# Patient Record
Sex: FEMALE | Race: WHITE | NOT HISPANIC OR LATINO | URBAN - METROPOLITAN AREA
[De-identification: names, ages, dates, MRNs, and addresses within clinical notes are randomized per-mention and may not be internally consistent; named-entity substitution may affect disease eponyms.]

---

## 2021-11-08 ENCOUNTER — EMERGENCY (EMERGENCY)
Facility: HOSPITAL | Age: 21
LOS: 0 days | Discharge: HOME | End: 2021-11-08
Attending: STUDENT IN AN ORGANIZED HEALTH CARE EDUCATION/TRAINING PROGRAM | Admitting: STUDENT IN AN ORGANIZED HEALTH CARE EDUCATION/TRAINING PROGRAM
Payer: COMMERCIAL

## 2021-11-08 VITALS
OXYGEN SATURATION: 100 % | DIASTOLIC BLOOD PRESSURE: 66 MMHG | HEART RATE: 88 BPM | SYSTOLIC BLOOD PRESSURE: 116 MMHG | RESPIRATION RATE: 20 BRPM

## 2021-11-08 VITALS
SYSTOLIC BLOOD PRESSURE: 120 MMHG | DIASTOLIC BLOOD PRESSURE: 72 MMHG | TEMPERATURE: 97 F | OXYGEN SATURATION: 100 % | RESPIRATION RATE: 20 BRPM | HEART RATE: 100 BPM

## 2021-11-08 DIAGNOSIS — R51.9 HEADACHE, UNSPECIFIED: ICD-10-CM
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
## 2021-11-08 DIAGNOSIS — Z91.011 ALLERGY TO MILK PRODUCTS: ICD-10-CM

## 2021-11-08 PROCEDURE — 99285 EMERGENCY DEPT VISIT HI MDM: CPT

## 2021-11-08 PROCEDURE — 72125 CT NECK SPINE W/O DYE: CPT | Mod: 26,MA

## 2021-11-08 PROCEDURE — 70450 CT HEAD/BRAIN W/O DYE: CPT | Mod: 26,MA

## 2021-11-08 NOTE — ED PROVIDER NOTE - NSFOLLOWUPCLINICS_GEN_ALL_ED_FT
[facility]  Medicine  [address], [place]   Phone: [phone]  Fax:   Follow Up Time: 1-3 Days

## 2021-11-08 NOTE — ED PROVIDER NOTE - NS ED ROS FT
Constitutional: Negative for fever  HENT: + headache.  Eyes: Negative for vision change.  Cardiovascular: Negative for chest pain, palpitation, and orthopnea.  Respiratory: Negative for SOB, wheezing, cough and sputum production.  Gastrointestinal: Negative for nausea, vomiting, abdominal pain, constipation, diarrhea, hematochezia, and melena.  Neurological: Negative for dizziness, syncope, focal weakness, numbness, and loss of consciousness.  Hematological: Does not bruise/bleed easily.

## 2021-11-08 NOTE — ED PROVIDER NOTE - PHYSICAL EXAMINATION
CONSTITUTIONAL: Well-appearing; well-nourished; in no apparent distress.   HEAD: Normocephalic; atraumatic.   EYES: Pupils are round and reactive, extra-ocular muscles are intact.   ENT: External ears are non-tender and without swelling or erythema. Hearing is intact with good acuity to spoken voice.  Patient is speaking clearly, not muffled and airway is intact.   NECK: Neck is supple without adenopathy. Trachea is midline. No midline tenderness.   RESPIRATORY: Chest wall is symmetric without deformity. No signs of respiratory distress.   CARDIOVASCULAR: Regular rate and rhythm. Normal peripheral perfusion.   GI: Abdomen is soft, non-tender,   BACK: No evidence of trauma or deformity. No midline tenderness.   PELVIS: No evidence of trauma or deformity. No tenderness to palpation.  NEURO: A & O x 4.   PSYCHOLOGICAL: Appropriate mood and affect. Good judgement and insight.

## 2021-11-08 NOTE — ED PROVIDER NOTE - PATIENT PORTAL LINK FT
You can access the FollowMyHealth Patient Portal offered by [facility] by registering at the following website: [email or web address]. By joining [facility]’s FollowMyHealth portal, you will also be able to view your health information using other applications (apps) compatible with our system.

## 2021-11-08 NOTE — ED PROVIDER NOTE - NSFOLLOWUPINSTRUCTIONS_ED_ALL_ED_FT
Fall Prevention    WHAT YOU NEED TO KNOW:    What is fall prevention? Fall prevention includes ways to make your home and other areas safer. It also includes ways you can move more carefully to prevent a fall.    What increases my risk for falls?   •Lack of vitamin D      •Not getting enough sleep each night      •Trouble walking or keeping your balance, or foot problems      •Health conditions that cause changes in your blood pressure, vision, or muscle strength and coordination      •Medicines that make you dizzy, weak, or sleepy      •Problems seeing clearly      •Shoes that have high heels or are not supportive      •Tripping hazards, such as items left on the floor, no handrails on the stairs, or broken steps      How can I help protect myself from falls?   •Stand or sit up slowly. This may help you keep your balance and prevent falls. If you need to get up during the night, sit up first. Be sure you are fully awake before you stand. Turn on the light before you start walking. Go slowly in case you are still sleepy. Make sure you will not trip over any pets sleeping in the bedroom.      •Use assistive devices as directed. Your healthcare provider may suggest that you use a cane or walker to help you keep your balance. You may need to have grab bars put in your bathroom near the toilet or in the shower.      •Wear shoes that fit well and have soles that . Wear shoes both inside and outside. Use slippers with good . Do not wear shoes with high heels.      •Wear a personal alarm. This is a device that allows you to call 911 if you fall and need help. Ask your healthcare provider for more information.      •Stay active. Exercise can help strengthen your muscles and improve your balance. Your healthcare provider may recommend water aerobics or walking. He or she may also recommend physical therapy to improve your coordination. Never start an exercise program without talking to your healthcare provider first.  Walking for Exercise           •Manage medical conditions. Keep all appointments with your healthcare providers. Visit your eye doctor as directed.      How can I make my home safer?     Fall Prevention for Adults     •Add items to prevent falls in the bathroom. Put nonslip strips on your bath or shower floor to prevent you from slipping. Use a bath mat if you do not have carpet in the bathroom. This will prevent you from falling when you step out of the bath or shower. Use a shower seat so you do not need to stand while you shower. Sit on the toilet or a chair in your bathroom to dry yourself and put on clothing. This will prevent you from losing your balance from drying or dressing yourself while you are standing.      •Keep paths clear. Remove books, shoes, and other objects from walkways and stairs. Place cords for telephones and lamps out of the way so that you do not need to walk over them. Tape them down if you cannot move them. Remove small rugs. If you cannot remove a rug, secure it with double-sided tape. This will prevent you from tripping.      •Install bright lights in your home. Use night lights to help light paths to the bathroom or kitchen. Always turn on the light before you start walking.      •Keep items you use often on shelves within reach. Do not use a step stool to help you reach an item.      •Paint or place reflective tape on the edges of your stairs. This will help you see the stairs better.      Call 911 or have someone else call if:   •You have fallen and are unconscious.      •You have fallen and cannot move part of your body.      Contact your healthcare provider if:   •You have fallen and have pain or a headache.      •You have questions or concerns about your condition or care.      CARE AGREEMENT:    You have the right to help plan your care. Learn about your health condition and how it may be treated. Discuss treatment options with your healthcare providers to decide what care you want to receive. You always have the right to refuse treatment.
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
## 2021-11-08 NOTE — ED ADULT NURSE NOTE - NSIMPLEMENTINTERV_GEN_ALL_ED
Implemented All Universal Safety Interventions:  [place] to call system. Call bell, personal items and telephone within reach. Instruct patient to call for assistance. Room bathroom lighting operational. Non-slip footwear when patient is off stretcher. Physically safe environment: no spills, clutter or unnecessary equipment. Stretcher in lowest position, wheels locked, appropriate side rails in place.

## 2021-11-08 NOTE — ED PROVIDER NOTE - CLINICAL SUMMARY MEDICAL DECISION MAKING FREE TEXT BOX
20 yr old f that presents s/p fall. neurovascularly intact. will obtain cth and c spine, which were negative. pt discharged with pcp follow up and strict return precautions.

## 2022-02-12 ENCOUNTER — EMERGENCY (EMERGENCY)
Facility: HOSPITAL | Age: 22
LOS: 0 days | Discharge: HOME | End: 2022-02-13
Attending: EMERGENCY MEDICINE | Admitting: EMERGENCY MEDICINE
Payer: COMMERCIAL

## 2022-02-12 VITALS
HEIGHT: 64 IN | RESPIRATION RATE: 18 BRPM | HEART RATE: 100 BPM | OXYGEN SATURATION: 100 % | DIASTOLIC BLOOD PRESSURE: 58 MMHG | TEMPERATURE: 98 F | SYSTOLIC BLOOD PRESSURE: 103 MMHG | WEIGHT: 104.06 LBS
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
## 2022-02-12 DIAGNOSIS — S61.412A LACERATION WITHOUT FOREIGN BODY OF LEFT HAND, INITIAL ENCOUNTER: ICD-10-CM

## 2022-02-12 DIAGNOSIS — S81.812A LACERATION WITHOUT FOREIGN BODY, LEFT LOWER LEG, INITIAL ENCOUNTER: ICD-10-CM

## 2022-02-12 PROCEDURE — 12001 RPR S/N/AX/GEN/TRNK 2.5CM/<: CPT

## 2022-02-12 PROCEDURE — 99283 EMERGENCY DEPT VISIT LOW MDM: CPT | Mod: 25
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
## 2022-02-12 NOTE — ED PROVIDER NOTE - PATIENT PORTAL LINK FT
You can access the FollowMyHealth Patient Portal offered by [facility] by registering at the following website: [email or web address]. By joining [facility]’s FollowMyHealth portal, you will also be able to view your health information using other applications (apps) compatible with our system.

## 2022-02-12 NOTE — ED PROVIDER NOTE - ATTENDING CONTRIBUTION TO CARE
I personally evaluated the patient. I reviewed the Resident’s or Physician Assistant’s note (as assigned above), and agree with the findings and plan except as documented in my note.  Chart reviewed.  Presents with laceration left hand and left leg from broken table.  Exam shows laceration left leg, abrasion left hand, no FB.

## 2022-02-12 NOTE — ED PROVIDER NOTE - PHYSICAL EXAMINATION
CONST: NAD  EYES: Sclera and conjunctiva clear.   ENT: No nasal discharge. Oropharynx normal appearing  NECK: Non-tender, no meningeal signs. normal ROM. supple   CARD: S1 S2; No jvd  RESP: Equal BS B/L, No wheezes, rhonchi or rales. No distress  GI: Soft, non-tender, non-distended. normal BS  MS: Normal ROM in all extremities. pulses 2 +. no calf tenderness or swelling  SKIN: 2cm laceration to LLE  NEURO: A&Ox3, No focal deficits. Strength 5/5 with no sensory deficits. Steady gait.
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
## 2022-03-17 ENCOUNTER — EMERGENCY (EMERGENCY)
Facility: HOSPITAL | Age: 22
LOS: 0 days | Discharge: HOME | End: 2022-03-17
Attending: EMERGENCY MEDICINE | Admitting: EMERGENCY MEDICINE
Payer: COMMERCIAL

## 2022-03-17 VITALS
DIASTOLIC BLOOD PRESSURE: 57 MMHG | SYSTOLIC BLOOD PRESSURE: 110 MMHG | HEART RATE: 94 BPM | TEMPERATURE: 98 F | OXYGEN SATURATION: 97 % | RESPIRATION RATE: 18 BRPM

## 2022-03-17 VITALS — HEIGHT: 64 IN

## 2022-03-17 DIAGNOSIS — S81.812D LACERATION WITHOUT FOREIGN BODY, LEFT LOWER LEG, SUBSEQUENT ENCOUNTER: ICD-10-CM
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
## 2022-03-17 PROCEDURE — L9995: CPT

## 2022-03-17 NOTE — ED PROVIDER NOTE - OBJECTIVE STATEMENT
20 yo female no pmhx presenting with suture removal of stitches from 2/12 for fall on L anterior shin. no fevers, discharge.

## 2022-03-17 NOTE — ED PROVIDER NOTE - PATIENT PORTAL LINK FT
You can access the FollowMyHealth Patient Portal offered by [facility] by registering at the following website: [email or web address]. By joining [email or web address]’s FollowMyHealth portal, you will also be able to view your health information using other applications (apps) compatible with our system.

## 2022-03-17 NOTE — ED PROVIDER NOTE - CARE PLAN
Principal Discharge DX:	Visit for suture removal   1 Principal Discharge DX:	Visit for suture removal  Assessment and plan of treatment:	Plan: Suture removal, wound care.

## 2022-03-17 NOTE — ED PROVIDER NOTE - NS ED ROS FT
Constitutional:  see HPI  Head:  no headache, dizziness, loss of consciousness  Eyes:  no visual changes; no eye pain, redness, or discharge  ENMT:  no ear pain or discharge; no hearing problems; no mouth or throat sores or lesions; no throat pain  MS: no myalgias, muscle weakness, joint pain,or  injury; no joint swelling  Neuro: no weakness; no numbness or tingling; no seizure  Skin:  healing laceration

## 2022-03-17 NOTE — ED PROVIDER NOTE - ATTENDING CONTRIBUTION TO CARE
21-year-old female with no significant past medical history presents for left shin suture removal.  Patient reports she was seen in ED on February 20 after trauma to left chin sustaining a small laceration.  Patient thought she had butterfly stitches in place and that she did not have to get them removed and noticed that they were still there so came to the ED today.  Patient denies any other complaints.  No fever or signs of infection.  No discharge.  Up-to-date on tetanus. No fever, chills, n/v, weakness, edema, calf pain/swelling/erythema, numbness/tingling, or rash.    Vital Signs: I have reviewed the initial vital signs. Constitutional: WDWN in nad. Sitting on stretcher speaking full sentences. Integumentary: No rash. no erythema  or streaking, no warmth to palpation, no crepitus, induration, fluctuance, no discharge, no signs of trauma, no abscess, (+) soft compartments. 2 sutures present- c/d/intact. ENT: MMM NECK: Supple, non-tender, no meningeal signs. Cardiovascular: RRR, radial pulses 2/4 b/l. No JVD. Respiratory: BS present b/l, ctabl, no wheezing or crackles, no accessory muscle use, no stridor. Gastrointestinal: BS present throughout all 4 quadrants, soft, nd, nt, no rebound tenderness or guarding, no cvat. Musculoskeletal: FROM, no edema, no calf pain/swelling/erythema. Neurologic: AAOx3, motor 5/5 and sensation intact throughout upper and lower ext, CN II-XII intact, No facial droop or slurring of speech. No focal deficits.

## 2022-03-17 NOTE — ED PROVIDER NOTE - PHYSICAL EXAMINATION
CONSTITUTIONAL: Well-developed; well-nourished; in no acute distress.   SKIN: healing 2 cm laceration on L anterior shin, no discharge, induration, erythema  HEAD: Normocephalic; atraumatic.  EYES: PERRL, EOMI, normal sclera and conjunctiva   ENT: No nasal discharge; airway clear.  NECK: Supple; non tender.  CARD: S1, S2 normal; no murmurs, gallops, or rubs. Regular rate and rhythm.   RESP: No wheezes, rales or rhonchi.  ABD: soft ntnd  EXT: Normal ROM.  No clubbing, cyanosis or edema.   LYMPH: No acute cervical adenopathy.  NEURO: Alert, oriented, grossly unremarkable  PSYCH: Cooperative, appropriate.

## 2022-03-17 NOTE — ED PROVIDER NOTE - CLINICAL SUMMARY MEDICAL DECISION MAKING FREE TEXT BOX
suture removal  discharge suture removal  discharge  pt aware proper wound care, signs and symptoms to return for, will follow up with pmd.

## 2022-03-17 NOTE — ED PROVIDER NOTE - PROGRESS NOTE DETAILS
Patient n/v intact with Full ROM and full motor strength with no signs of any serious injury. No signs of tendon injury on direct examination. Tetanus up-to-date. Wound care discussed in detail. Signs of infection discussed. Medications administered and prescribed/OTC home meds discussed.  All questions and concerns from patient or family addressed. Understanding of instructions verbalized.

## 2022-08-27 ENCOUNTER — EMERGENCY (EMERGENCY)
Facility: HOSPITAL | Age: 22
LOS: 0 days | Discharge: HOME | End: 2022-08-27
Attending: EMERGENCY MEDICINE | Admitting: EMERGENCY MEDICINE

## 2022-08-27 VITALS
DIASTOLIC BLOOD PRESSURE: 60 MMHG | HEIGHT: 64 IN | OXYGEN SATURATION: 99 % | WEIGHT: 102.96 LBS | HEART RATE: 108 BPM | SYSTOLIC BLOOD PRESSURE: 102 MMHG | TEMPERATURE: 99 F | RESPIRATION RATE: 18 BRPM

## 2022-08-27 VITALS
HEART RATE: 94 BPM | TEMPERATURE: 99 F | DIASTOLIC BLOOD PRESSURE: 62 MMHG | RESPIRATION RATE: 18 BRPM | SYSTOLIC BLOOD PRESSURE: 118 MMHG | OXYGEN SATURATION: 98 %

## 2022-08-27 DIAGNOSIS — Z72.89 OTHER PROBLEMS RELATED TO LIFESTYLE: ICD-10-CM

## 2022-08-27 DIAGNOSIS — R11.2 NAUSEA WITH VOMITING, UNSPECIFIED: ICD-10-CM

## 2022-08-27 DIAGNOSIS — Z91.011 ALLERGY TO MILK PRODUCTS: ICD-10-CM

## 2022-08-27 LAB
ALBUMIN SERPL ELPH-MCNC: 5.1 G/DL — SIGNIFICANT CHANGE UP (ref 3.5–5.2)
ALP SERPL-CCNC: 87 U/L — SIGNIFICANT CHANGE UP (ref 30–115)
ALT FLD-CCNC: 17 U/L — SIGNIFICANT CHANGE UP (ref 0–41)
ANION GAP SERPL CALC-SCNC: 11 MMOL/L — SIGNIFICANT CHANGE UP (ref 7–14)
AST SERPL-CCNC: 28 U/L — SIGNIFICANT CHANGE UP (ref 0–41)
BASOPHILS # BLD AUTO: 0.02 K/UL — SIGNIFICANT CHANGE UP (ref 0–0.2)
BASOPHILS NFR BLD AUTO: 0.2 % — SIGNIFICANT CHANGE UP (ref 0–1)
BILIRUB DIRECT SERPL-MCNC: <0.2 MG/DL — SIGNIFICANT CHANGE UP (ref 0–0.3)
BILIRUB INDIRECT FLD-MCNC: >0.3 MG/DL — SIGNIFICANT CHANGE UP (ref 0.2–1.2)
BILIRUB SERPL-MCNC: 0.5 MG/DL — SIGNIFICANT CHANGE UP (ref 0.2–1.2)
BUN SERPL-MCNC: 14 MG/DL — SIGNIFICANT CHANGE UP (ref 10–20)
CALCIUM SERPL-MCNC: 10.3 MG/DL — HIGH (ref 8.5–10.1)
CHLORIDE SERPL-SCNC: 99 MMOL/L — SIGNIFICANT CHANGE UP (ref 98–110)
CO2 SERPL-SCNC: 28 MMOL/L — SIGNIFICANT CHANGE UP (ref 17–32)
CREAT SERPL-MCNC: 0.7 MG/DL — SIGNIFICANT CHANGE UP (ref 0.7–1.5)
EGFR: 126 ML/MIN/1.73M2 — SIGNIFICANT CHANGE UP
EOSINOPHIL # BLD AUTO: 0 K/UL — SIGNIFICANT CHANGE UP (ref 0–0.7)
EOSINOPHIL NFR BLD AUTO: 0 % — SIGNIFICANT CHANGE UP (ref 0–8)
GLUCOSE SERPL-MCNC: 104 MG/DL — HIGH (ref 70–99)
HCG SERPL QL: NEGATIVE — SIGNIFICANT CHANGE UP
HCT VFR BLD CALC: 43 % — SIGNIFICANT CHANGE UP (ref 37–47)
HGB BLD-MCNC: 14.5 G/DL — SIGNIFICANT CHANGE UP (ref 12–16)
IMM GRANULOCYTES NFR BLD AUTO: 0.2 % — SIGNIFICANT CHANGE UP (ref 0.1–0.3)
LIDOCAIN IGE QN: 12 U/L — SIGNIFICANT CHANGE UP (ref 7–60)
LYMPHOCYTES # BLD AUTO: 0.89 K/UL — LOW (ref 1.2–3.4)
LYMPHOCYTES # BLD AUTO: 6.8 % — LOW (ref 20.5–51.1)
MAGNESIUM SERPL-MCNC: 1.9 MG/DL — SIGNIFICANT CHANGE UP (ref 1.8–2.4)
MCHC RBC-ENTMCNC: 30 PG — SIGNIFICANT CHANGE UP (ref 27–31)
MCHC RBC-ENTMCNC: 33.7 G/DL — SIGNIFICANT CHANGE UP (ref 32–37)
MCV RBC AUTO: 88.8 FL — SIGNIFICANT CHANGE UP (ref 81–99)
MONOCYTES # BLD AUTO: 0.54 K/UL — SIGNIFICANT CHANGE UP (ref 0.1–0.6)
MONOCYTES NFR BLD AUTO: 4.1 % — SIGNIFICANT CHANGE UP (ref 1.7–9.3)
NEUTROPHILS # BLD AUTO: 11.67 K/UL — HIGH (ref 1.4–6.5)
NEUTROPHILS NFR BLD AUTO: 88.7 % — HIGH (ref 42.2–75.2)
NRBC # BLD: 0 /100 WBCS — SIGNIFICANT CHANGE UP (ref 0–0)
PLATELET # BLD AUTO: 349 K/UL — SIGNIFICANT CHANGE UP (ref 130–400)
POTASSIUM SERPL-MCNC: 4.8 MMOL/L — SIGNIFICANT CHANGE UP (ref 3.5–5)
POTASSIUM SERPL-SCNC: 4.8 MMOL/L — SIGNIFICANT CHANGE UP (ref 3.5–5)
PROT SERPL-MCNC: 8.2 G/DL — HIGH (ref 6–8)
RBC # BLD: 4.84 M/UL — SIGNIFICANT CHANGE UP (ref 4.2–5.4)
RBC # FLD: 13.1 % — SIGNIFICANT CHANGE UP (ref 11.5–14.5)
SODIUM SERPL-SCNC: 138 MMOL/L — SIGNIFICANT CHANGE UP (ref 135–146)
WBC # BLD: 13.15 K/UL — HIGH (ref 4.8–10.8)
WBC # FLD AUTO: 13.15 K/UL — HIGH (ref 4.8–10.8)

## 2022-08-27 PROCEDURE — 99285 EMERGENCY DEPT VISIT HI MDM: CPT

## 2022-08-27 PROCEDURE — 93010 ELECTROCARDIOGRAM REPORT: CPT

## 2022-08-27 RX ORDER — ONDANSETRON 8 MG/1
1 TABLET, FILM COATED ORAL
Qty: 9 | Refills: 0
Start: 2022-08-27 | End: 2022-08-29

## 2022-08-27 RX ORDER — ONDANSETRON 8 MG/1
4 TABLET, FILM COATED ORAL ONCE
Refills: 0 | Status: COMPLETED | OUTPATIENT
Start: 2022-08-27 | End: 2022-08-27

## 2022-08-27 RX ORDER — FAMOTIDINE 10 MG/ML
20 INJECTION INTRAVENOUS ONCE
Refills: 0 | Status: COMPLETED | OUTPATIENT
Start: 2022-08-27 | End: 2022-08-27

## 2022-08-27 RX ORDER — SODIUM CHLORIDE 9 MG/ML
2000 INJECTION INTRAMUSCULAR; INTRAVENOUS; SUBCUTANEOUS ONCE
Refills: 0 | Status: COMPLETED | OUTPATIENT
Start: 2022-08-27 | End: 2022-08-27

## 2022-08-27 RX ADMIN — ONDANSETRON 4 MILLIGRAM(S): 8 TABLET, FILM COATED ORAL at 19:50

## 2022-08-27 RX ADMIN — FAMOTIDINE 20 MILLIGRAM(S): 10 INJECTION INTRAVENOUS at 19:50

## 2022-08-27 RX ADMIN — SODIUM CHLORIDE 2000 MILLILITER(S): 9 INJECTION INTRAMUSCULAR; INTRAVENOUS; SUBCUTANEOUS at 19:50

## 2022-08-27 NOTE — ED PROVIDER NOTE - NS ED ROS FT
Review of Systems  Constitutional:  No fever, chills.  Eyes:  No visual changes, eye pain, or discharge.  ENMT:  No hearing changes, pain, or discharge. No nasal congestion, discharge, or bleeding. No throat pain, swelling, or difficulty swallowing.  Cardiac:  No chest pain, palpitations, syncope, or edema.  Respiratory:  No dyspnea, cough. No hemoptysis.  GI:  No diarrhea, or abdominal pain. (+) nausea, vomiting  :  No dysuria, hematuria, frequency, or burning.  Skin:  No skin rash, pruritis, jaundice, or lesions.  Neuro:  No headache, dizziness, loss of sensation, or focal weakness.  No change in mental status.

## 2022-08-27 NOTE — ED PROVIDER NOTE - ATTENDING APP SHARED VISIT CONTRIBUTION OF CARE
I personally evaluated the patient. I reviewed the Resident´s or Physician Assistant´s note (as assigned above), and agree with the findings and plan except as documented in my note.  21-year-old female, drank alcohol last night, presents with vomiting today.  No abdominal pain or diarrhea.  Exam shows alert patient in no distress, HEENT NCAT PERRL, neck supple, lungs clear, RR S1S2, abdomen soft NT +BS, no CCE, neuro A&OX3 GCS 15 no deficits.

## 2022-08-27 NOTE — ED PROVIDER NOTE - PATIENT PORTAL LINK FT
You can access the FollowMyHealth Patient Portal offered by [facility] by registering at the following website: [email or web address]. By joining [employer] [facility]’s FollowMyHealth portal, you will also be able to view your health information using other applications (apps) compatible with our system.

## 2022-08-27 NOTE — ED PROVIDER NOTE - CLINICAL SUMMARY MEDICAL DECISION MAKING FREE TEXT BOX
Labs unremarkable.  EKG normal sinus rhythm no acute changes.  Given IV fluids, Zofran and Pepcid with improvement.  Tolerated p.o. fluids.  Will DC to follow-up with PCP.

## 2022-08-27 NOTE — ED PROVIDER NOTE - NS ED ATTENDING STATEMENT MOD
This was a shared visit with the GETACHEW. I reviewed and verified the documentation and independently performed the documented:

## 2022-08-27 NOTE — ED PROVIDER NOTE - OBJECTIVE STATEMENT
20 yo F with no significant PMHx presents to the ED c/o persisting nausea and NBNB vomiting since last night. Pt was drinking alcohol last night, states she had about 5 drinks and then began to feel sick. Pt has been unable to tolerate oral intake prompting ED evaluation. She denies any drug use. She denies other complaints.

## 2023-01-19 ENCOUNTER — EMERGENCY (EMERGENCY)
Facility: HOSPITAL | Age: 23
LOS: 0 days | Discharge: HOME | End: 2023-01-20
Attending: EMERGENCY MEDICINE | Admitting: EMERGENCY MEDICINE
Payer: COMMERCIAL

## 2023-01-19 VITALS
RESPIRATION RATE: 18 BRPM | TEMPERATURE: 98 F | HEART RATE: 114 BPM | OXYGEN SATURATION: 98 % | DIASTOLIC BLOOD PRESSURE: 71 MMHG | WEIGHT: 100.09 LBS | SYSTOLIC BLOOD PRESSURE: 120 MMHG

## 2023-01-19 DIAGNOSIS — R21 RASH AND OTHER NONSPECIFIC SKIN ERUPTION: ICD-10-CM

## 2023-01-19 DIAGNOSIS — Z87.2 PERSONAL HISTORY OF DISEASES OF THE SKIN AND SUBCUTANEOUS TISSUE: ICD-10-CM

## 2023-01-19 DIAGNOSIS — Z91.011 ALLERGY TO MILK PRODUCTS: ICD-10-CM

## 2023-01-19 PROCEDURE — 99284 EMERGENCY DEPT VISIT MOD MDM: CPT

## 2023-01-19 RX ORDER — DEXAMETHASONE 0.5 MG/5ML
10 ELIXIR ORAL ONCE
Refills: 0 | Status: COMPLETED | OUTPATIENT
Start: 2023-01-19 | End: 2023-01-19

## 2023-01-19 RX ADMIN — Medication 10 MILLIGRAM(S): at 23:53

## 2023-01-19 NOTE — ED PROVIDER NOTE - PROGRESS NOTE DETAILS
Supportive care and home care discussed in detail. Patient aware they may have to return for re-evaluation and possible admission if outpatient treatment fails. Strict return precautions discussed.  Will follow up with derm.

## 2023-01-19 NOTE — ED PROVIDER NOTE - NSFOLLOWUPINSTRUCTIONS_ED_ALL_ED_FT
PLEASE FOLLOW UP WITH YOUR DERMATOLOGIST IN 1-3 DAYS.     Rash    A rash is a change in the color of the skin. A rash can also change the way your skin feels. There are many different conditions and factors that can cause a rash, most of which are not dangerous. Make sure to follow up with your primary care physician or a dermatologist as instructed by your health care provider.    SEEK IMMEDIATE MEDICAL CARE IF YOU HAVE ANY OF THE FOLLOWING SYMPTOMS: fever, blisters, a rash inside your mouth, vaginal or anal pain, or altered mental status.

## 2023-01-19 NOTE — ED PROVIDER NOTE - CARE PROVIDER_API CALL
Edmund Sandhu (MD)  Dermatology; Internal Medicine  [address] [place] [address]  Phone: [phone]  Fax: [phone]  Follow Up Time: 1-3 Days

## 2023-01-19 NOTE — ED PROVIDER NOTE - CLINICAL SUMMARY MEDICAL DECISION MAKING FREE TEXT BOX
22-year-old female with past medical history of eczema recently started on Dupixent injection, first shot was obtained 2 weeks ago second shot she took today at 2:30 PM presents with rash that she noticed diffusely on her trunk and legs around 8 PM but subsided about an hour ago.  Patient has pictures of rash, very flat macules, not pruritic, no bleeding, states did not look like her eczema.  Patient reports she had a similar rash happened to her before that subsided on its own.  Patient reports no symptoms at this time.  Did not take anything for the rash.  pt not on any abx, no new detergents, not around weeds, no new pets or foods, no bug bites. denies fever, chills, n/v, cp, sob, pleuritic chest pain, palpitations, diaphoresis, cough, drooling/secretions, trismus, neck swelling, neck stiffness, muffled/change in voice, dysphagia, odonoyphagia, drainage, trauma, weakness, numbness/tingling, abd pain, diarrhea, constipation, urinary symptoms, ha/lh/dizziness, involvement of palms or soles, gu lesions,  sick contacts, recent travel.     on exam: non toxic appearing pt speaking full sentences, no rash on exam, no involvement of mucous membranes, palms or soles, no petechiae, , no bleeding, no target lesions, no central clearing, no bleeding. no oropharyngeal edema, uvula midline, no neck swelling- supple, non-tender, RRR, radial pulses 2/4 b/l, ctabl w/ breath sounds present b/l, no wheezing or crackles, good air exchange, good respiratory effort, no accessory muscle use, no tachypnea, no stridor, bs present throughout all 4 quadrants, soft ntnd, no r/g, no hepatosplenomegaly, AAOx3. Cn II-XII intact, No focal deficits.    pt with no signs of infection, no rash on exam, no signs of anaphylaxis.  Appropriate medications for patient's presenting complaints were ordered and effects were reassessed.  Patient's records (outpt reccords) were reviewed.   Escalation to admission/observation was considered.  1) However patient feels much better and is comfortable with discharge.  Appropriate follow-up was arranged.

## 2023-01-19 NOTE — ED PROVIDER NOTE - ATTENDING APP SHARED VISIT CONTRIBUTION OF CARE
22-year-old female with past medical history of eczema recently started on Dupixent injection, first shot was obtained 2 weeks ago second shot she took today at 2:30 PM presents with rash that she noticed diffusely on her trunk and legs around 8 PM but subsided about an hour ago.  Patient has pictures of rash, very flat macules, not pruritic, no bleeding, states did not look like her eczema.  Patient reports she had a similar rash happened to her before that subsided on its own.  Patient reports no symptoms at this time.  Did not take anything for the rash.  pt not on any abx, no new detergents, not around weeds, no new pets or foods, no bug bites. denies fever, chills, n/v, cp, sob, pleuritic chest pain, palpitations, diaphoresis, cough, drooling/secretions, trismus, neck swelling, neck stiffness, muffled/change in voice, dysphagia, odonoyphagia, drainage, trauma, weakness, numbness/tingling, abd pain, diarrhea, constipation, urinary symptoms, ha/lh/dizziness, involvement of palms or soles, gu lesions,  sick contacts, recent travel. t    on exam: non toxic appearing pt speaking full sentences, no rash on exam, no involvement of mucous membranes, palms or soles, no petechiae, , no bleeding, no target lesions, no central clearing, no bleeding. no oropharyngeal edema, uvula midline, no neck swelling- supple, non-tender, RRR, radial pulses 2/4 b/l, ctabl w/ breath sounds present b/l, no wheezing or crackles, good air exchange, good respiratory effort, no accessory muscle use, no tachypnea, no stridor, bs present throughout all 4 quadrants, soft ntnd, no r/g, no hepatosplenomegaly, AAOx3. Cn II-XII intact, No focal deficits.    Plan: Decadron.

## 2023-01-19 NOTE — ED PROVIDER NOTE - OBJECTIVE STATEMENT
22-year-old female with past medical history of eczema presented for evaluation of rash noted throughout her whole body today after getting on the shower.  Patient had a Dupixent injection earlier today for her eczema.  No other known precipitating factors.  No new exposures to medications, allergens, detergents, or soaps.  States that rash is now resolving.  No difficulty breathing.  No nausea, vomiting, diarrhea, abdominal pain.

## 2023-01-19 NOTE — ED PROVIDER NOTE - PATIENT PORTAL LINK FT
You can access the FollowMyHealth Patient Portal offered by [facility] by registering at the following website: [email or web address]. By joining [employer]’s FollowMyHealth portal, you will also be able to view your health information using other applications (apps) compatible with our system.

## 2023-01-20 VITALS
RESPIRATION RATE: 18 BRPM | DIASTOLIC BLOOD PRESSURE: 62 MMHG | OXYGEN SATURATION: 99 % | HEART RATE: 104 BPM | SYSTOLIC BLOOD PRESSURE: 105 MMHG | TEMPERATURE: 97 F

## 2023-01-20 RX ORDER — DUPILUMAB 300 MG/2ML
0 INJECTION, SOLUTION SUBCUTANEOUS
Qty: 0 | Refills: 0 | DISCHARGE

## 2023-01-20 NOTE — ED ADULT NURSE NOTE - OBJECTIVE STATEMENT
States pain starts in low back and radaites down to hips  Hx of back pain /injury before  Patient reports she got out of the shower and noticed large welt like rash to entire torso, arms and legs. Patient denies new medication or food exposure, states similar incident happened two years prior once. Patient denies recent illness, denies itching or pain to rash site, denies fever, denies throat or respiratory symptoms, face clear. Patient reports symptoms improving prior to receiving MD ordered medication. Patient did get a dose of Dupixent for eczema today, denies any prior reaction when receiving medication before.

## 2024-01-03 NOTE — ED ADULT NURSE NOTE - NSFALLRSKHARMRISK_ED_ALL_ED
[Vaginal Wall Prolapse] : no [Rectal Prolapse] : mild [Unable To Restrain Bowel Movement] : moderate [x3+] : nocturia three or more  times a night [Feelings Of Urinary Urgency] : no [Pain During Urination (Dysuria)] : no [Urinary Tract Infection] : moderate [Constipation Obstructed Defecation] : no [] : no [Incomplete Emptying Of Stool] : no [Pelvic Pain] : no [Vaginal Pain] : no [FreeTextEntry1] : MIGUEL is a 57 year female who presents for f/u on JOVITA. Currently taking Myrbetriq 25mg daily for OAB symptoms. Reports frequency urgency is well managed by medication. Here to discuss SLING CYSTO scheduled for 01/17/2024.    UDS with JOIVTA, no DO, no UUI 10/09/2023 Normal cysto 11/21/2023   CT Abdomen & Pelvis 09/20/2023 - Bilateral adrenal adenomas. no

## 2024-06-20 NOTE — ED ADULT TRIAGE NOTE - ACCOMPANIED BY
You were evaluated in the Emergency Department today for your back pain. Your evaluation did not show signs of medical conditions requiring emergent intervention at this time.  You can take ibuprofen or Tylenol for pain, you can alternate these medications.    Please schedule an appointment for follow-up with your primary care physician this week for further evaluation of your symptoms.    Return to the Emergency Department if you experience worsening back pain, difficulty walking, fevers, numbness, tingling, incontinence, or any other concerning symptoms.  
Self

## 2024-08-07 ENCOUNTER — INPATIENT (INPATIENT)
Facility: HOSPITAL | Age: 24
LOS: 2 days | Discharge: HOME | DRG: 392 | End: 2024-08-10
Attending: INTERNAL MEDICINE | Admitting: FAMILY MEDICINE
Payer: COMMERCIAL

## 2024-08-07 VITALS
HEART RATE: 102 BPM | WEIGHT: 104.94 LBS | OXYGEN SATURATION: 99 % | DIASTOLIC BLOOD PRESSURE: 67 MMHG | TEMPERATURE: 98 F | RESPIRATION RATE: 18 BRPM | SYSTOLIC BLOOD PRESSURE: 102 MMHG

## 2024-08-07 LAB
ALBUMIN SERPL ELPH-MCNC: 4.6 G/DL — SIGNIFICANT CHANGE UP (ref 3.5–5.2)
ALP SERPL-CCNC: 70 U/L — SIGNIFICANT CHANGE UP (ref 30–115)
ALT FLD-CCNC: 9 U/L — SIGNIFICANT CHANGE UP (ref 0–41)
ANION GAP SERPL CALC-SCNC: 16 MMOL/L — HIGH (ref 7–14)
APPEARANCE UR: CLEAR — SIGNIFICANT CHANGE UP
AST SERPL-CCNC: 15 U/L — SIGNIFICANT CHANGE UP (ref 0–41)
BASOPHILS # BLD AUTO: 0.03 K/UL — SIGNIFICANT CHANGE UP (ref 0–0.2)
BASOPHILS NFR BLD AUTO: 0.2 % — SIGNIFICANT CHANGE UP (ref 0–1)
BILIRUB SERPL-MCNC: 0.6 MG/DL — SIGNIFICANT CHANGE UP (ref 0.2–1.2)
BILIRUB UR-MCNC: NEGATIVE — SIGNIFICANT CHANGE UP
BUN SERPL-MCNC: 14 MG/DL — SIGNIFICANT CHANGE UP (ref 10–20)
CALCIUM SERPL-MCNC: 9.8 MG/DL — SIGNIFICANT CHANGE UP (ref 8.4–10.5)
CHLORIDE SERPL-SCNC: 101 MMOL/L — SIGNIFICANT CHANGE UP (ref 98–110)
CO2 SERPL-SCNC: 22 MMOL/L — SIGNIFICANT CHANGE UP (ref 17–32)
COLOR SPEC: YELLOW — SIGNIFICANT CHANGE UP
CREAT SERPL-MCNC: 1.2 MG/DL — SIGNIFICANT CHANGE UP (ref 0.7–1.5)
DIFF PNL FLD: NEGATIVE — SIGNIFICANT CHANGE UP
EGFR: 65 ML/MIN/1.73M2 — SIGNIFICANT CHANGE UP
EOSINOPHIL # BLD AUTO: 0 K/UL — SIGNIFICANT CHANGE UP (ref 0–0.7)
EOSINOPHIL NFR BLD AUTO: 0 % — SIGNIFICANT CHANGE UP (ref 0–8)
GLUCOSE SERPL-MCNC: 144 MG/DL — HIGH (ref 70–99)
GLUCOSE UR QL: NEGATIVE MG/DL — SIGNIFICANT CHANGE UP
HCG SERPL QL: NEGATIVE — SIGNIFICANT CHANGE UP
HCT VFR BLD CALC: 40.9 % — SIGNIFICANT CHANGE UP (ref 37–47)
HGB BLD-MCNC: 13.7 G/DL — SIGNIFICANT CHANGE UP (ref 12–16)
IMM GRANULOCYTES NFR BLD AUTO: 0.5 % — HIGH (ref 0.1–0.3)
KETONES UR-MCNC: 15 MG/DL
LEUKOCYTE ESTERASE UR-ACNC: NEGATIVE — SIGNIFICANT CHANGE UP
LIDOCAIN IGE QN: 18 U/L — SIGNIFICANT CHANGE UP (ref 7–60)
LYMPHOCYTES # BLD AUTO: 0.59 K/UL — LOW (ref 1.2–3.4)
LYMPHOCYTES # BLD AUTO: 3.1 % — LOW (ref 20.5–51.1)
MCHC RBC-ENTMCNC: 29.8 PG — SIGNIFICANT CHANGE UP (ref 27–31)
MCHC RBC-ENTMCNC: 33.5 G/DL — SIGNIFICANT CHANGE UP (ref 32–37)
MCV RBC AUTO: 88.9 FL — SIGNIFICANT CHANGE UP (ref 81–99)
MONOCYTES # BLD AUTO: 1.09 K/UL — HIGH (ref 0.1–0.6)
MONOCYTES NFR BLD AUTO: 5.7 % — SIGNIFICANT CHANGE UP (ref 1.7–9.3)
NEUTROPHILS # BLD AUTO: 17.31 K/UL — HIGH (ref 1.4–6.5)
NEUTROPHILS NFR BLD AUTO: 90.5 % — HIGH (ref 42.2–75.2)
NITRITE UR-MCNC: NEGATIVE — SIGNIFICANT CHANGE UP
NRBC # BLD: 0 /100 WBCS — SIGNIFICANT CHANGE UP (ref 0–0)
PH UR: 6.5 — SIGNIFICANT CHANGE UP (ref 5–8)
PLATELET # BLD AUTO: 231 K/UL — SIGNIFICANT CHANGE UP (ref 130–400)
PMV BLD: 10.7 FL — HIGH (ref 7.4–10.4)
POTASSIUM SERPL-MCNC: 4.2 MMOL/L — SIGNIFICANT CHANGE UP (ref 3.5–5)
POTASSIUM SERPL-SCNC: 4.2 MMOL/L — SIGNIFICANT CHANGE UP (ref 3.5–5)
PROT SERPL-MCNC: 7.2 G/DL — SIGNIFICANT CHANGE UP (ref 6–8)
PROT UR-MCNC: 100 MG/DL
RBC # BLD: 4.6 M/UL — SIGNIFICANT CHANGE UP (ref 4.2–5.4)
RBC # FLD: 12.7 % — SIGNIFICANT CHANGE UP (ref 11.5–14.5)
SODIUM SERPL-SCNC: 139 MMOL/L — SIGNIFICANT CHANGE UP (ref 135–146)
SP GR SPEC: 1.01 — SIGNIFICANT CHANGE UP (ref 1–1.03)
UROBILINOGEN FLD QL: 0.2 MG/DL — SIGNIFICANT CHANGE UP (ref 0.2–1)
WBC # BLD: 19.11 K/UL — HIGH (ref 4.8–10.8)
WBC # FLD AUTO: 19.11 K/UL — HIGH (ref 4.8–10.8)

## 2024-08-07 PROCEDURE — 99285 EMERGENCY DEPT VISIT HI MDM: CPT

## 2024-08-07 PROCEDURE — 76830 TRANSVAGINAL US NON-OB: CPT | Mod: 26

## 2024-08-07 RX ORDER — BACTERIOSTATIC SODIUM CHLORIDE 0.9 %
1000 VIAL (ML) INJECTION ONCE
Refills: 0 | Status: COMPLETED | OUTPATIENT
Start: 2024-08-07 | End: 2024-08-07

## 2024-08-07 RX ORDER — KETOROLAC TROMETHAMINE 10 MG
15 TABLET ORAL ONCE
Refills: 0 | Status: DISCONTINUED | OUTPATIENT
Start: 2024-08-07 | End: 2024-08-07

## 2024-08-07 RX ORDER — ONDANSETRON HCL/PF 4 MG/2 ML
4 VIAL (ML) INJECTION ONCE
Refills: 0 | Status: COMPLETED | OUTPATIENT
Start: 2024-08-07 | End: 2024-08-07

## 2024-08-07 RX ORDER — IOHEXOL 240 MG/ML
30 INJECTION, SOLUTION INTRATHECAL; INTRAVASCULAR; INTRAVENOUS; ORAL ONCE
Refills: 0 | Status: COMPLETED | OUTPATIENT
Start: 2024-08-07 | End: 2024-08-07

## 2024-08-07 RX ADMIN — IOHEXOL 30 MILLILITER(S): 240 INJECTION, SOLUTION INTRATHECAL; INTRAVASCULAR; INTRAVENOUS; ORAL at 22:50

## 2024-08-07 RX ADMIN — Medication 4 MILLIGRAM(S): at 22:50

## 2024-08-07 RX ADMIN — Medication 1000 MILLILITER(S): at 22:50

## 2024-08-07 RX ADMIN — Medication 15 MILLIGRAM(S): at 22:50

## 2024-08-07 NOTE — ED PROVIDER NOTE - OBJECTIVE STATEMENT
23-year-old female no past medical history, no past surgical history presents to the emergency department with lower abdominal pain nausea and vomiting since this morning.  Pain now radiates to lower back patient reports symptoms are intermittent but now become constant.  Patient denies dysuria.  Reports chills but no fevers. Patient denies any marijuana use.  Endorses vaping nicotine.  No alcohol use.

## 2024-08-07 NOTE — ED PROVIDER NOTE - ATTENDING CONTRIBUTION TO CARE
23-year-old female no past medical history, no past surgical history presents to the emergency department with lower abdominal pain nausea and vomiting since this morning.  Pain now radiates to lower back patient reports symptoms are intermittent but now become constant.  Patient denies dysuria.  Reports chills but no fevers.    CONSTITUTIONAL: vomiting in stretcher   SKIN: warm, dry  HEAD: Normocephalic; atraumatic.  ENT: MMM.   NECK: Supple.  CARD: RRR.   RESP: No wheezes, rales or rhonchi.  ABD: soft +lower abdominal tenderness, no CVAT.   EXT: Normal ROM.  No lower extremity edema.   NEURO: Alert, oriented, grossly unremarkable.

## 2024-08-07 NOTE — ED PROVIDER NOTE - CLINICAL SUMMARY MEDICAL DECISION MAKING FREE TEXT BOX
23yFpw lower abdominal pain nv  worse  tonight ,     WBC  19,  CTAP Diffuse urinary bladder wall thickening, which could be attributed to   	underdistention versus urinary bladder infection/cystitis; correlation   	with urinalysis recommended.  	Otherwise unremarkable CT abdomen and pelvis.  no uti,  TVUS   negative.  Pt  conitnued to be unable to tolerate PO -   admitted to medicine

## 2024-08-07 NOTE — ED PROVIDER NOTE - PHYSICAL EXAMINATION
CONSTITUTIONAL: vomiting in stretcher   SKIN: warm, dry  HEAD: Normocephalic; atraumatic.  ENT: MMM.   NECK: Supple.  CARD: RRR.   RESP: No wheezes, rales or rhonchi.  ABD: soft +lower abdominal tenderness, no CVAT.   EXT: Normal ROM.  No lower extremity edema.   NEURO: Alert, oriented, grossly unremarkable.

## 2024-08-07 NOTE — ED PROVIDER NOTE - CARE PLAN
Assessment and plan of treatment:	plan- labs ct ua meds reassess   Principal Discharge DX:	Nausea and vomiting  Assessment and plan of treatment:	plan- labs ct ua meds reassess  Secondary Diagnosis:	Abdominal pain   1

## 2024-08-07 NOTE — ED PROVIDER NOTE - NS ED ATTENDING STATEMENT MOD
This was a shared visit with the GETACHEW. I reviewed and verified the documentation. Attending with

## 2024-08-07 NOTE — ED PROVIDER NOTE - DIFFERENTIAL DIAGNOSIS
Differential Diagnosis The differential diagnosis for patients clinical presentation includes but is not limited to: appendicitis, colitis, ovarian torsion, ovarian cyst/rupture, gastroenteritis, pancreatitis

## 2024-08-07 NOTE — ED ADULT TRIAGE NOTE - TEMPERATURE IN CELSIUS (DEGREES C)
Speech IRP Treatment                               .                       Primary Rehabilitation Diagnosis: cva  Expected Discharge Date:  (LOS-1-2 weeks)  Planned Discharge Destination: Home      Therapy Diagnosis:  Cognitive Communication Deficit    ASSESSMENT:  Patient was seen on IRP for communication and cognition treatment.    Consistent cueing needed to proceed through tasks with multiple repetition of questions and directions. Pt answered yes/no questions with one response and would provide further details discussing the opposite answer so question accuracy of comprehension during conversational speech.     RECOMMENDATIONS:      Continued skilled service reasonable and necessary for speech therapy to improve communication and cognition for safe return to home environment with supervision.      OBJECTIVE:  See below for current functional status overview.  See Speech flowsheets for full details regarding the speech therapy provided.    PLAN:   Plan for Next Session: assess whole pills with liquid assist, functional problem solving/sequencing activities  SLP Identified Barriers to Discharge: dysphagia, cognition  Recommendations for Discharge: SLP: post-IRP ST    EDUCATION:   On this date, the patient's spouse educated on cueing techniques.    The response to education: Needs reinforcement    RECOMMENDATIONS FOR DISCHARGE:  SLP Identified Barriers to Discharge: dysphagia, cognition (05/18/17 1101)  Recommendations for Discharge: SLP: post-IRP ST (05/18/17 1101)        Communication/Cognition Data Overview Last 24 hours       Subjective  Subjective Comments: reported sleeping well last night.  (05/18/17 1101)    Observation  Observation Comments: had hearing aides in and said they were not working well, Pt removed them and agreed to wear the pocket talker (05/18/17 1101)    Behavior/Social Interaction  Arousal and Alertness: Delayed responses to stimuli (05/18/17 1101)  Cooperates with Tasks: Intact (05/18/17  1101)  Maintains Eye Contact: Mild impairment (05/18/17 1101)  Pragmatics: Mild impairment (05/18/17 1101)  Appropriate Behavior: Intact (05/18/17 1101)  Emotional Lability: Intact (05/18/17 1101)  Affect: Intact (05/18/17 1101)  Frustration Level: Intact (05/18/17 1101)    Verbal Expression  Conversational Discourse: Mild impairment (05/18/17 1101)  Effective Techniques: Louder volume;Repetition (05/18/17 1101)  Interfering Components: Perseveration (05/18/17 1101)         Auditory Comprehension  Yes/No Questions 2 Unit: Mild impairment (05/18/17 1101)  Yes/No Questions 3 Unit: Moderate impairment (05/18/17 1101)  Commands 2 Unit: Moderate impairment (05/18/17 1101)  Conversation - Simple: Moderate impairment (05/18/17 1101)  Interfering Components: Cognition;Environmental  distraction (hearing acuity) (05/18/17 1101)  Effective Techniques: Louder volume;Repetition (05/18/17 1101)  Auditory Comprehension Comments: answered yes/no question one way but then discussed further wigh different details (05/18/17 1101)                   Attention  Sustained Attention: Mild impairment (05/18/17 1101)              Problem Solving  Simple Problem Solving: Severe impairment (05/18/17 1101)  Safety/Judgement: Severe impairment (05/18/17 1101)  Insight: Severe impairment (05/18/17 1101)  Task Initiation: Delayed initiation (05/18/17 1101)  Flexibility of Thought: Severe impairment (05/18/17 1101)  Effective Techniques: cues to proceed to next task (05/18/17 1101)  Interfering Components: cognition, perseveration (05/18/17 1101)  Problem Solving Comments: constant cueing required (05/18/17 1101)           All Speech Therapy Goals:    SLP Goals  Short Term Goals to be Reviewed on : 05/20/17 (05/13/17 1100)  STG are the Same as Discharge Goals: Yes (05/13/17 1100)  Goal Agreement: Patient agrees with goals and treatment plan;Family/significant other/caregiver agrees (05/13/17 1100)         Memory Short Term Goal 1  Memory Discharge  Goal 1: Pt will recall 1-2 new pieces of information about functional task or safety at end of session and at following session with mild cues, for improved learning and carryoer (05/13/17 1100)  Memory Discharge Goal 1 Progress: Outcome not met, continue to monitor (05/07/17 1700)         Problem Solving Short Term Goal 1  Problem Solving Discharge Goal 1: Pt will demonstrate verbal and functional sequencing of simple tasks with 80% accuracy and minimal perseveration , mild cues provided (05/13/17 1100)  Problem Solving Discharge Goal 1 Progress: Outcome not met, continue to monitor (05/07/17 1700)                   Auditory Comprehension Short Term Goal 1  Auditory Comprehension Discharge Goal 1: answer non personal Yes/No questions 80% (05/13/17 1100)  Auditory Comprehension Discharge Goal 1 Progress: Outcome met, continue evaluating goal progress toward completion (05/07/17 1700)    Verbal/Gesture Short Term Goal 1  Verbal/Gesture Discharge Goal 1: HOLD (05/07/17 1700)         Visual/Reading Comprehension Short Term Goal 1  Visual/Reading Comprehension Discharge Goal 1: further assess reading, scanning, functional perception (05/07/17 1700)    Written Expression Short Term Goal 1  Written Expression Discharge Goal 1: Write sentences for social communication at 80% accuracy with mild cue (05/13/17 1100)  Written Expression Discharge Goal 1 Progress: Outcome not met, continue to monitor (05/07/17 1700)         Swallowing Short Term Goal 1  Swallowing Discharge Goal 1: Pt will safely chew and swallow mechainical soft diet and nectar thick liquids without complications of aspiration with constant supervision for swallow (05/13/17 1100)  Swallowing Discharge Goal 1 Progress: Outcome met, continue evaluating goal progress toward completion (05/06/17 1150)    Swallowing Short Term Goal 2  Swallowing Discharge Goal 2: Pt will tolerate trials of easy chew vs general consistency foods and thin liquids during swallow tx as  preparation for diet advancement.  Pt will use swallow strategies with mild cues (05/13/17 1100)  Swallowing Discharge Goal 2 Progress: Outcome not met, continue to monitor (05/06/17 1150)    Swallowing Short Term Goal 3  Swallowing Discharge Goal 3:  Pt will complete oromotor and swallow exercises with moderate cues, for improved safety and efficiency of swallow function (05/13/17 1100)  Swallowing Discharge Goal 3 Progress: Outcome met, continue evaluating goal progress toward completion (05/06/17 1150)                                            SLP Time Spent: 30 min (05/18/17 1101)         36.7

## 2024-08-08 DIAGNOSIS — R10.9 UNSPECIFIED ABDOMINAL PAIN: ICD-10-CM

## 2024-08-08 PROBLEM — L30.9 DERMATITIS, UNSPECIFIED: Chronic | Status: ACTIVE | Noted: 2023-01-20

## 2024-08-08 LAB
ALBUMIN SERPL ELPH-MCNC: 4 G/DL — SIGNIFICANT CHANGE UP (ref 3.5–5.2)
ALP SERPL-CCNC: 59 U/L — SIGNIFICANT CHANGE UP (ref 30–115)
ALT FLD-CCNC: 10 U/L — SIGNIFICANT CHANGE UP (ref 0–41)
ANION GAP SERPL CALC-SCNC: 14 MMOL/L — SIGNIFICANT CHANGE UP (ref 7–14)
ANION GAP SERPL CALC-SCNC: 15 MMOL/L — HIGH (ref 7–14)
AST SERPL-CCNC: 22 U/L — SIGNIFICANT CHANGE UP (ref 0–41)
BASOPHILS # BLD AUTO: 0.01 K/UL — SIGNIFICANT CHANGE UP (ref 0–0.2)
BASOPHILS NFR BLD AUTO: 0.1 % — SIGNIFICANT CHANGE UP (ref 0–1)
BILIRUB SERPL-MCNC: 0.5 MG/DL — SIGNIFICANT CHANGE UP (ref 0.2–1.2)
BUN SERPL-MCNC: 16 MG/DL — SIGNIFICANT CHANGE UP (ref 10–20)
BUN SERPL-MCNC: 17 MG/DL — SIGNIFICANT CHANGE UP (ref 10–20)
CALCIUM SERPL-MCNC: 9 MG/DL — SIGNIFICANT CHANGE UP (ref 8.4–10.5)
CALCIUM SERPL-MCNC: 9.3 MG/DL — SIGNIFICANT CHANGE UP (ref 8.4–10.5)
CHLORIDE SERPL-SCNC: 102 MMOL/L — SIGNIFICANT CHANGE UP (ref 98–110)
CHLORIDE SERPL-SCNC: 105 MMOL/L — SIGNIFICANT CHANGE UP (ref 98–110)
CO2 SERPL-SCNC: 21 MMOL/L — SIGNIFICANT CHANGE UP (ref 17–32)
CO2 SERPL-SCNC: 22 MMOL/L — SIGNIFICANT CHANGE UP (ref 17–32)
CREAT SERPL-MCNC: 1.5 MG/DL — SIGNIFICANT CHANGE UP (ref 0.7–1.5)
CREAT SERPL-MCNC: 1.7 MG/DL — HIGH (ref 0.7–1.5)
EGFR: 43 ML/MIN/1.73M2 — LOW
EGFR: 50 ML/MIN/1.73M2 — LOW
EOSINOPHIL # BLD AUTO: 0 K/UL — SIGNIFICANT CHANGE UP (ref 0–0.7)
EOSINOPHIL NFR BLD AUTO: 0 % — SIGNIFICANT CHANGE UP (ref 0–8)
GLUCOSE SERPL-MCNC: 102 MG/DL — HIGH (ref 70–99)
GLUCOSE SERPL-MCNC: 143 MG/DL — HIGH (ref 70–99)
HCT VFR BLD CALC: 37.3 % — SIGNIFICANT CHANGE UP (ref 37–47)
HCT VFR BLD CALC: 37.6 % — SIGNIFICANT CHANGE UP (ref 37–47)
HGB BLD-MCNC: 12.7 G/DL — SIGNIFICANT CHANGE UP (ref 12–16)
HGB BLD-MCNC: 13 G/DL — SIGNIFICANT CHANGE UP (ref 12–16)
IMM GRANULOCYTES NFR BLD AUTO: 0.5 % — HIGH (ref 0.1–0.3)
LACTATE SERPL-SCNC: 2.5 MMOL/L — HIGH (ref 0.7–2)
LYMPHOCYTES # BLD AUTO: 0.53 K/UL — LOW (ref 1.2–3.4)
LYMPHOCYTES # BLD AUTO: 3.2 % — LOW (ref 20.5–51.1)
MCHC RBC-ENTMCNC: 30.5 PG — SIGNIFICANT CHANGE UP (ref 27–31)
MCHC RBC-ENTMCNC: 30.7 PG — SIGNIFICANT CHANGE UP (ref 27–31)
MCHC RBC-ENTMCNC: 33.8 G/DL — SIGNIFICANT CHANGE UP (ref 32–37)
MCHC RBC-ENTMCNC: 34.9 G/DL — SIGNIFICANT CHANGE UP (ref 32–37)
MCV RBC AUTO: 88.2 FL — SIGNIFICANT CHANGE UP (ref 81–99)
MCV RBC AUTO: 90.2 FL — SIGNIFICANT CHANGE UP (ref 81–99)
MONOCYTES # BLD AUTO: 0.71 K/UL — HIGH (ref 0.1–0.6)
MONOCYTES NFR BLD AUTO: 4.2 % — SIGNIFICANT CHANGE UP (ref 1.7–9.3)
NEUTROPHILS # BLD AUTO: 15.43 K/UL — HIGH (ref 1.4–6.5)
NEUTROPHILS NFR BLD AUTO: 92 % — HIGH (ref 42.2–75.2)
NRBC # BLD: 0 /100 WBCS — SIGNIFICANT CHANGE UP (ref 0–0)
NRBC # BLD: 0 /100 WBCS — SIGNIFICANT CHANGE UP (ref 0–0)
PLATELET # BLD AUTO: 191 K/UL — SIGNIFICANT CHANGE UP (ref 130–400)
PLATELET # BLD AUTO: 201 K/UL — SIGNIFICANT CHANGE UP (ref 130–400)
PMV BLD: 11.4 FL — HIGH (ref 7.4–10.4)
PMV BLD: 11.5 FL — HIGH (ref 7.4–10.4)
POTASSIUM SERPL-MCNC: 4 MMOL/L — SIGNIFICANT CHANGE UP (ref 3.5–5)
POTASSIUM SERPL-MCNC: 4.2 MMOL/L — SIGNIFICANT CHANGE UP (ref 3.5–5)
POTASSIUM SERPL-SCNC: 4 MMOL/L — SIGNIFICANT CHANGE UP (ref 3.5–5)
POTASSIUM SERPL-SCNC: 4.2 MMOL/L — SIGNIFICANT CHANGE UP (ref 3.5–5)
PROT SERPL-MCNC: 6.2 G/DL — SIGNIFICANT CHANGE UP (ref 6–8)
RBC # BLD: 4.17 M/UL — LOW (ref 4.2–5.4)
RBC # BLD: 4.23 M/UL — SIGNIFICANT CHANGE UP (ref 4.2–5.4)
RBC # FLD: 12.7 % — SIGNIFICANT CHANGE UP (ref 11.5–14.5)
RBC # FLD: 12.7 % — SIGNIFICANT CHANGE UP (ref 11.5–14.5)
SODIUM SERPL-SCNC: 138 MMOL/L — SIGNIFICANT CHANGE UP (ref 135–146)
SODIUM SERPL-SCNC: 141 MMOL/L — SIGNIFICANT CHANGE UP (ref 135–146)
WBC # BLD: 16.76 K/UL — HIGH (ref 4.8–10.8)
WBC # BLD: 19.21 K/UL — HIGH (ref 4.8–10.8)
WBC # FLD AUTO: 16.76 K/UL — HIGH (ref 4.8–10.8)
WBC # FLD AUTO: 19.21 K/UL — HIGH (ref 4.8–10.8)

## 2024-08-08 PROCEDURE — 85027 COMPLETE CBC AUTOMATED: CPT

## 2024-08-08 PROCEDURE — 80053 COMPREHEN METABOLIC PANEL: CPT

## 2024-08-08 PROCEDURE — G0378: CPT

## 2024-08-08 PROCEDURE — 87086 URINE CULTURE/COLONY COUNT: CPT

## 2024-08-08 PROCEDURE — 74177 CT ABD & PELVIS W/CONTRAST: CPT | Mod: 26,MC

## 2024-08-08 PROCEDURE — 83605 ASSAY OF LACTIC ACID: CPT

## 2024-08-08 PROCEDURE — 80048 BASIC METABOLIC PNL TOTAL CA: CPT

## 2024-08-08 PROCEDURE — 85025 COMPLETE CBC W/AUTO DIFF WBC: CPT

## 2024-08-08 PROCEDURE — 36415 COLL VENOUS BLD VENIPUNCTURE: CPT

## 2024-08-08 PROCEDURE — 87040 BLOOD CULTURE FOR BACTERIA: CPT

## 2024-08-08 PROCEDURE — 99222 1ST HOSP IP/OBS MODERATE 55: CPT

## 2024-08-08 RX ORDER — ACETAMINOPHEN 500 MG
650 TABLET ORAL EVERY 6 HOURS
Refills: 0 | Status: DISCONTINUED | OUTPATIENT
Start: 2024-08-08 | End: 2024-08-10

## 2024-08-08 RX ORDER — PANTOPRAZOLE SODIUM 20 MG/1
40 TABLET, DELAYED RELEASE ORAL EVERY 12 HOURS
Refills: 0 | Status: DISCONTINUED | OUTPATIENT
Start: 2024-08-08 | End: 2024-08-10

## 2024-08-08 RX ORDER — ONDANSETRON HCL/PF 4 MG/2 ML
4 VIAL (ML) INJECTION EVERY 4 HOURS
Refills: 0 | Status: DISCONTINUED | OUTPATIENT
Start: 2024-08-08 | End: 2024-08-10

## 2024-08-08 RX ORDER — BACTERIOSTATIC SODIUM CHLORIDE 0.9 %
1000 VIAL (ML) INJECTION
Refills: 0 | Status: DISCONTINUED | OUTPATIENT
Start: 2024-08-08 | End: 2024-08-10

## 2024-08-08 RX ORDER — METOCLOPRAMIDE HCL 5 MG/ML
10 VIAL (ML) INJECTION ONCE
Refills: 0 | Status: COMPLETED | OUTPATIENT
Start: 2024-08-08 | End: 2024-08-08

## 2024-08-08 RX ORDER — MAGNESIUM, ALUMINUM HYDROXIDE 200-225/5
30 SUSPENSION, ORAL (FINAL DOSE FORM) ORAL EVERY 4 HOURS
Refills: 0 | Status: DISCONTINUED | OUTPATIENT
Start: 2024-08-08 | End: 2024-08-10

## 2024-08-08 RX ORDER — ONDANSETRON HCL/PF 4 MG/2 ML
4 VIAL (ML) INJECTION ONCE
Refills: 0 | Status: COMPLETED | OUTPATIENT
Start: 2024-08-08 | End: 2024-08-08

## 2024-08-08 RX ADMIN — PANTOPRAZOLE SODIUM 40 MILLIGRAM(S): 20 TABLET, DELAYED RELEASE ORAL at 17:35

## 2024-08-08 RX ADMIN — Medication 10 MILLIGRAM(S): at 22:10

## 2024-08-08 RX ADMIN — Medication 100 MILLILITER(S): at 03:37

## 2024-08-08 RX ADMIN — Medication 4 MILLIGRAM(S): at 05:10

## 2024-08-08 RX ADMIN — Medication 4 MILLIGRAM(S): at 17:35

## 2024-08-08 RX ADMIN — Medication 100 MILLIGRAM(S): at 05:09

## 2024-08-08 RX ADMIN — PANTOPRAZOLE SODIUM 40 MILLIGRAM(S): 20 TABLET, DELAYED RELEASE ORAL at 05:10

## 2024-08-08 RX ADMIN — Medication 4 MILLIGRAM(S): at 03:10

## 2024-08-08 RX ADMIN — Medication 4 MILLIGRAM(S): at 09:43

## 2024-08-08 NOTE — CONSULT NOTE ADULT - TIME BILLING
Coordination of care
I have personally seen and examined this patient.    I have reviewed all pertinent clinical information and reviewed all relevant imaging and diagnostic studies personally.   I discussed recommendations with the primary team.

## 2024-08-08 NOTE — CONSULT NOTE ADULT - ASSESSMENT
ID is consulted for UTI  Afebrile, WBC 19.11 > 16.76  Satting well on RA  UA WBC 4, UCx pending, no dysuria  BCx pending    CT A/P wo contrast 8/8  Diffuse urinary bladder wall thickening, which could be attributed to   underdistention versus urinary bladder infection/cystitis; correlation   with urinalysis recommended.  Otherwise unremarkable CT abdomen and pelvis.    Patient reported eating sushi the day prior, also ate some leftover fried rice on the day of symptoms, but abdominal pain happened before eating  Overall low suspicion for UTI given no symptoms and unremarkable UA  Symptoms are possibly 2/2 food poisoning  Leukocytosis can be 2/2 stress-induced      IMPRESSION:  Non-infectious gastroenteritis  Food poisoning  Leukocytosis  ERICH    RECOMMENDATIONS:  - D/C ceftriaxone and monitor off abx for now  - Continue oral and IV hydration  - GI follow up  - Offloading and frequent position changes, aspiration precaution  - Trend WBC, fever curve, transaminases, creatinine daily      Itzel Christine D.O.  Attending Physician  Division of Infectious Diseases  [facility]  Please contact me via [employer] Teams   ID is consulted for UTI  Afebrile, WBC 19.11 > 16.76  Satting well on RA  UA WBC 4, UCx pending, no dysuria  BCx pending    CT A/P wo contrast 8/8  Diffuse urinary bladder wall thickening, which could be attributed to   underdistention versus urinary bladder infection/cystitis; correlation   with urinalysis recommended.  Otherwise unremarkable CT abdomen and pelvis.    Patient reported eating sushi the day prior, also ate some leftover fried rice on the day of symptoms, but abdominal pain happened before eating  Overall low suspicion for UTI given no symptoms and unremarkable UA  Symptoms are possibly 2/2 food poisoning  Leukocytosis can be 2/2 stress-induced      IMPRESSION:  Infectious gastroenteritis  Food poisoning  Leukocytosis  ERICH    RECOMMENDATIONS:  - D/C ceftriaxone and monitor off abx for now  - Continue oral and IV hydration  - GI follow up  - Offloading and frequent position changes, aspiration precaution  - Trend WBC, fever curve, transaminases, creatinine daily      Itzel Christine D.O.  Attending Physician  Division of Infectious Diseases  [facility]  Please contact me via [employer] Teams

## 2024-08-08 NOTE — H&P ADULT - ASSESSMENT
23-year-old female no past medical history, no past surgical history presented to the emergency department with 10/10 upper abdominal pain with associated nausea and vomiting since 7pm last night.     # Abd pain  # N/V  - pregnancy neg  - Possible cystitis  - WBC 19, afebrile  - UA neg  - Ucx  - Bcx  - Start rocephin pending Ucx  - ID con  - CLD  - IVF  - zofran STAT  - GI con  - lactate  - repeat labs  23-year-old female no past medical history, no past surgical history presented to the emergency department with 10/10 upper abdominal pain with associated nausea and vomiting since 7pm last night.     # Abd pain  # N/V  - serum pregnancy neg  - WBC 19, afebrile  - UA neg  - Ucx  - Bcx  - Start rocephin pending Ucx  - ID con  - CLD  - IVF  - zofran STAT  - GI con  - lactate  - repeat labs  23-year-old female no past medical history, no past surgical history presented to the emergency department with 10/10 upper abdominal pain with associated nausea and vomiting since 7pm last night.     # Abd pain  # N/V  - serum pregnancy neg  - WBC 19, afebrile  - UA neg  - Ucx  - Bcx  - Start rocephin pending Ucx  - ID con  - CLD  - IVF  - PPI  - pain control  - zofran STAT  - GI con  - lactate  - repeat labs  23-year-old female no past medical history, no past surgical history presented to the emergency department with 10/10 upper abdominal pain with associated nausea and vomiting since 7pm last night.     # Abd pain  # N/V  - serum pregnancy neg  - WBC 19, afebrile  - UA neg  - f/u Ucx, Bcx  - Start rocephin pending Ucx  - ID con  - CLD  - IVF  - PPI  - pain control  - zofran STAT  - GI con  - lactate  - repeat labs    #Progress Note Handoff  Pending (specify):  as above   Family discussion:  plan of care was discussed with patient   in details.  all questions were answered.  seems to understand, and in agreement  Disposition:  home

## 2024-08-08 NOTE — CONSULT NOTE ADULT - ASSESSMENT
23yFemale pmh eczema and hypotension presents for n/v the past few days and sweating. Patient reports she had some lower abdominal pain that has now resolved. N/v resolved, no sick contacts, N-said use    #abdominal pain--->resolved  #n/v---->resolved  ddx gastritis, esophagitis, #infection, cystitis on CT  patient had normal Bm yesterday  Rec  -ppi ppx  -zofran prn  -vaping cessation advised  -clear liquid diet and advance as tolerated  -if vomiting persists may consider EGD  - Follow up with our [facility] located at [address] [place] [address]. Phone Number: [phone]     #Diffuse urinary bladder wall thickening, which could be attributed to   underdistention versus urinary bladder infection/cystitis; correlation   with urinalysis recommended.  Rec  - Care as per primary team

## 2024-08-08 NOTE — H&P ADULT - NSHPPHYSICALEXAM_GEN_ALL_CORE
VITALS:   T(C): 36.7 (08-07-24 @ 22:03), Max: 36.7 (08-07-24 @ 22:03)  HR: 102 (08-07-24 @ 22:03) (102 - 102)  BP: 102/67 (08-07-24 @ 22:03) (102/67 - 102/67)  RR: 18 (08-07-24 @ 22:03) (18 - 18)  SpO2: 99% (08-07-24 @ 22:03) (99% - 99%)    GENERAL: NAD, lying in bed actively vomiting  HEAD:  Atraumatic, Normocephalic  EYES: EOMI,  conjunctiva and sclera clear  ENT: Moist mucous membranes  NECK: Supple, No JVD  CHEST/LUNG: CTA b/l Unlabored respirations  HEART: Regular rate and rhythm; No murmurs, rubs, or gallops  ABDOMEN: Bowel sounds present; Soft, Nondistended. b/l upper quadrant ttp  EXTREMITIES: No clubbing, cyanosis, or edema  NERVOUS SYSTEM:  Alert & Oriented X3, speech clear. No deficits   MSK: FROM all 4 extremities, full and equal strength  SKIN: No rashes or lesions

## 2024-08-08 NOTE — CHART NOTE - NSCHARTNOTEFT_GEN_A_CORE
This note is for educational purposes only. Please refer to official GI consult note.     S: 22 y/o F, PMH eczema and hypotension admitted for multiple days of non bloody N/V, lower abdominal pain, and sweating. Today, patient currently denies nausea or vomiting, last episode of non bloody emesis last PM. She continuously endorses lower abdominal pain but notes the pain has moderately resolved from initial presentation. Currently denies nausea, vomiting, hematemesis, melena, blood in stool, diarrhea, constipation, NSAID use, sick contacts, new medications or supplements.     O:   Vitals:   T(F): 97.3 (08-08-24 @ 06:03), Max: 98.9 (08-08-24 @ 03:29)  HR: 70 (08-08-24 @ 06:03) (61 - 102)  BP: 118/80 (08-08-24 @ 06:03) (102/67 - 119/77)  RR: 16 (08-08-24 @ 06:03) (16 - 18)  SpO2: 97% (08-08-24 @ 06:03) (97% - 100%)    PHYSICAL EXAM:  GENERAL: AAOx3, no acute distress.  HEAD: NCAT   LUNGS: Clear to auscultation bilaterally; No wheeze, rhonchi, or rales  HEART: Regular rate and rhythm; normal S1, S2, No murmurs.  ABDOMEN: Soft, nontender, nondistended; Bowel sounds present  NEUROLOGY: No asterixis or tremor  SKIN: Intact, no jaundice    LABS:  08-08    141  |  105  |  17  ----------------------------<  143<H>  4.2   |  21  |  1.7<H>    Ca    9.3      08 Aug 2024 04:30    TPro  7.2  /  Alb  4.6  /  TBili  0.6  /  DBili  x   /  AST  15  /  ALT  9   /  AlkPhos  70  08-07                          13.0   16.76 )-----------( 201      ( 08 Aug 2024 04:30 )             37.3     LIVER FUNCTIONS - ( 07 Aug 2024 22:27 )  Alb: 4.6 g/dL / Pro: 7.2 g/dL / ALK PHOS: 70 U/L / ALT: 9 U/L / AST: 15 U/L / GGT: x               IMAGING:    < from: CT Abdomen and Pelvis w/ Oral Cont and w/ IV Cont (08.08.24 @ 01:11) >    ACC: [id number] EXAM:  CT ABDOMEN AND PELVIS OC IC   ORDERED BY: ZHENG JOHNSON     PROCEDURE DATE:  08/08/2024          INTERPRETATION:  CLINICAL HISTORY: Left lower quadrant pain..    TECHNIQUE: Contiguous axial CT images were obtained of the abdomen and   pelvis following the administration of oral and intravenous contrast.   Sagittal, coronal, and MIP reformats were performed.    CONTRAST/COMPLICATIONS:  IV Contrast: Omnipaque 350   95 cc administered   5 cc discarded  Oral Contrast: Was administered.    COMPARISON: None.      FINDINGS:  LOWER CHEST: Unremarkable.    HEPATOBILIARY: Right hepatic subcentimeter hypodensity is too small to   characterize. Unremarkable CT appearance of the gallbladder.    SPLEEN: Unremarkable.    PANCREAS: Unremarkable.    ADRENAL GLANDS: Unremarkable.    KIDNEYS: Symmetric renal enhancement. No hydronephrosis. Right renal cyst.    PELVIC ORGANS: Diffuse urinary bladder wall thickening, which could be   attributed to underdistention versus urinary bladder infection/cystitis;   correlation with urinalysis recommended..    PERITONEUM/MESENTERY/BOWEL: No bowel obstruction. Normal appendix. No   free air. Trace pelvis ascites, nonspecific.    VASCULAR: Normal caliber aorta.    ABDOMINOPELVIC NODES: No enlarged abdominal or pelvic lymph nodes.    BONES/SOFT TISSUES: No acute osseous abnormality.      IMPRESSION:  Diffuse urinary bladder wall thickening, which could be attributed to   underdistention versus urinary bladder infection/cystitis; correlation   with urinalysis recommended.  Otherwise unremarkable CT abdomen and pelvis.    --- End of Report ---          ARCADIO MARADIAGA MD; Resident Radiologist  This document has been electronically signed.  JENNIFER RICKETTS MD; Attending Radiologist  This document has been electronically signed. Aug  8 2024  1:37AM       Assessment: 22 y/o F, PMH eczema and hypotension admitted for multiple days of non bloody N/V, lower abdominal pain, and sweating.     Plan:   1) abdominal pain   2) N/V   -IVF NS   -Morphine for pain management   -PPI BID   -Zofran for nausea   -CLD   -Dietary and lifestyle modifications advised   -ADAT This note is for educational purposes only. Please refer to official GI consult note. GI student    S: 22 y/o F, PMH eczema and hypotension admitted for multiple days of non bloody N/V, lower abdominal pain, and sweating. Today, patient currently denies nausea or vomiting, last episode of non bloody emesis last PM. She continuously endorses lower abdominal pain but notes the pain has moderately resolved from initial presentation. Currently denies nausea, vomiting, hematemesis, melena, blood in stool, diarrhea, constipation, NSAID use, sick contacts, new medications or supplements.     O:   Vitals:   T(F): 97.3 (08-08-24 @ 06:03), Max: 98.9 (08-08-24 @ 03:29)  HR: 70 (08-08-24 @ 06:03) (61 - 102)  BP: 118/80 (08-08-24 @ 06:03) (102/67 - 119/77)  RR: 16 (08-08-24 @ 06:03) (16 - 18)  SpO2: 97% (08-08-24 @ 06:03) (97% - 100%)    PHYSICAL EXAM:  GENERAL: AAOx3, no acute distress.  HEAD: NCAT   LUNGS: Clear to auscultation bilaterally; No wheeze, rhonchi, or rales  HEART: Regular rate and rhythm; normal S1, S2, No murmurs.  ABDOMEN: Soft, nontender, nondistended; Bowel sounds present  NEUROLOGY: No asterixis or tremor  SKIN: Intact, no jaundice    LABS:  08-08    141  |  105  |  17  ----------------------------<  143<H>  4.2   |  21  |  1.7<H>    Ca    9.3      08 Aug 2024 04:30    TPro  7.2  /  Alb  4.6  /  TBili  0.6  /  DBili  x   /  AST  15  /  ALT  9   /  AlkPhos  70  08-07                          13.0   16.76 )-----------( 201      ( 08 Aug 2024 04:30 )             37.3     LIVER FUNCTIONS - ( 07 Aug 2024 22:27 )  Alb: 4.6 g/dL / Pro: 7.2 g/dL / ALK PHOS: 70 U/L / ALT: 9 U/L / AST: 15 U/L / GGT: x               IMAGING:    < from: CT Abdomen and Pelvis w/ Oral Cont and w/ IV Cont (08.08.24 @ 01:11) >    ACC: [id number] EXAM:  CT ABDOMEN AND PELVIS OC IC   ORDERED BY: ZHENG JOHNSON     PROCEDURE DATE:  08/08/2024          INTERPRETATION:  CLINICAL HISTORY: Left lower quadrant pain..    TECHNIQUE: Contiguous axial CT images were obtained of the abdomen and   pelvis following the administration of oral and intravenous contrast.   Sagittal, coronal, and MIP reformats were performed.    CONTRAST/COMPLICATIONS:  IV Contrast: Omnipaque 350   95 cc administered   5 cc discarded  Oral Contrast: Was administered.    COMPARISON: None.      FINDINGS:  LOWER CHEST: Unremarkable.    HEPATOBILIARY: Right hepatic subcentimeter hypodensity is too small to   characterize. Unremarkable CT appearance of the gallbladder.    SPLEEN: Unremarkable.    PANCREAS: Unremarkable.    ADRENAL GLANDS: Unremarkable.    KIDNEYS: Symmetric renal enhancement. No hydronephrosis. Right renal cyst.    PELVIC ORGANS: Diffuse urinary bladder wall thickening, which could be   attributed to underdistention versus urinary bladder infection/cystitis;   correlation with urinalysis recommended..    PERITONEUM/MESENTERY/BOWEL: No bowel obstruction. Normal appendix. No   free air. Trace pelvis ascites, nonspecific.    VASCULAR: Normal caliber aorta.    ABDOMINOPELVIC NODES: No enlarged abdominal or pelvic lymph nodes.    BONES/SOFT TISSUES: No acute osseous abnormality.      IMPRESSION:  Diffuse urinary bladder wall thickening, which could be attributed to   underdistention versus urinary bladder infection/cystitis; correlation   with urinalysis recommended.  Otherwise unremarkable CT abdomen and pelvis.    --- End of Report ---          ARCADIO MARADIAGA MD; Resident Radiologist  This document has been electronically signed.  JENNIFER RICKETTS MD; Attending Radiologist  This document has been electronically signed. Aug  8 2024  1:37AM       Assessment: 22 y/o F, PMH eczema and hypotension admitted for multiple days of non bloody N/V, lower abdominal pain, and sweating.     Plan:   1) abdominal pain   2) N/V   -IVF NS   -Morphine for pain management   -PPI BID   -Zofran for nausea   -CLD   -Dietary and lifestyle modifications advised   -ADAT

## 2024-08-08 NOTE — CONSULT NOTE ADULT - CONSULT REQUESTED BY NAME
"Subjective:     Patient ID: Jonatan Ocampo is a 81 y.o. male.    CC:   Chief Complaint   Patient presents with   • Mass of left temporal lobe       HPI:   History of Present Illness  The following portions of the patient's history were reviewed and updated as appropriate: allergies, current medications, past family history, past medical history, past social history, past surgical history and problem list.     Patient has been increasing drowsy, more forgetful, \"sleeping all the time\" per wife. He has not had any seizures or headaches, MRI brain was stable in January. His wife reports him snoring occasionally, denies gasping. She would like to try decreasing his Keppra dose.    Past Medical History:   Diagnosis Date   • Abnormal MRI of head    • Generalized convulsive seizure (CMS/HCC)    • Hyperlipidemia    • Hypertension    • Seizures (CMS/HCC)        Past Surgical History:   Procedure Laterality Date   • NO PAST SURGERIES         Social History     Socioeconomic History   • Marital status:      Spouse name: Not on file   • Number of children: Not on file   • Years of education: Not on file   • Highest education level: Not on file   Tobacco Use   • Smoking status: Never Smoker   • Smokeless tobacco: Never Used   Substance and Sexual Activity   • Alcohol use: No   • Drug use: No   • Sexual activity: Defer       Family History   Problem Relation Age of Onset   • Alzheimer's disease Mother    • Depression Mother    • Dementia Mother    • Cancer Mother         Review of Systems   Constitutional: Negative for chills, fatigue, fever and unexpected weight change.   HENT: Negative for ear pain, hearing loss, nosebleeds, rhinorrhea and sore throat.    Eyes: Negative for photophobia, pain, discharge, itching and visual disturbance.   Respiratory: Negative for cough, chest tightness, shortness of breath and wheezing.    Cardiovascular: Negative for chest pain, palpitations and leg swelling.   Gastrointestinal: " "Negative for abdominal pain, blood in stool, constipation, diarrhea, nausea and vomiting.   Genitourinary: Negative for dysuria, frequency, hematuria and urgency.   Musculoskeletal: Negative for arthralgias, back pain, gait problem, joint swelling, myalgias, neck pain and neck stiffness.   Skin: Negative for rash and wound.   Allergic/Immunologic: Negative for environmental allergies and food allergies.   Neurological: Negative for dizziness, tremors, seizures, syncope, speech difficulty, weakness, light-headedness, numbness and headaches.   Hematological: Negative for adenopathy. Does not bruise/bleed easily.   Psychiatric/Behavioral: Negative for agitation, confusion, decreased concentration, hallucinations, sleep disturbance and suicidal ideas. The patient is not nervous/anxious.         Objective:  /90   Pulse 67   Temp 97.1 °F (36.2 °C)   Ht 172.7 cm (68\")   Wt 90.7 kg (200 lb)   SpO2 97%   BMI 30.41 kg/m²     Neurologic Exam     Mental Status   Oriented to person, place, and time.       Physical Exam  Constitutional:       Appearance: He is well-developed.   HENT:      Head: Normocephalic and atraumatic.   Cardiovascular:      Rate and Rhythm: Normal rate and regular rhythm.   Pulmonary:      Effort: Pulmonary effort is normal.   Neurological:      General: No focal deficit present.      Mental Status: He is alert and oriented to person, place, and time.      Deep Tendon Reflexes: Reflexes are normal and symmetric.      Comments: Speech clear, VFF, no facial droop, no limb drift, MMSE 29/30, STM 3/3.   Psychiatric:         Behavior: Behavior normal.         Thought Content: Thought content normal.         Assessment/Plan:       Jonatan was seen today for mass of left temporal lobe.    Diagnoses and all orders for this visit:    Memory loss  -     Comprehensive Metabolic Panel  -     CBC & Differential  -     TSH  -     T4, Free  -     Vitamin B12  -     Methylmalonic Acid, Serum  -     Folate  -     " CBC Auto Differential    Chronic fatigue  -     Comprehensive Metabolic Panel  -     CBC & Differential  -     TSH  -     T4, Free  -     Vitamin B12  -     Methylmalonic Acid, Serum  -     Folate  -     CBC Auto Differential    Hypersomnolence  -     Comprehensive Metabolic Panel  -     CBC & Differential  -     TSH  -     T4, Free  -     Vitamin B12  -     Methylmalonic Acid, Serum  -     Folate  -     CBC Auto Differential    Mass of temporal lobe  -     MRI Brain Without Contrast; Future    Generalized convulsive seizures (CMS/HCC)  -     levETIRAcetam (KEPPRA) 500 MG tablet; Take 0.5 tablets by mouth Every 12 (Twelve) Hours.    He is to decrease Keppra dose to 250 mg bid, increase fluid intake, call if not better.         Reviewed medications, potential side effects and signs and symptoms to report. Discussed risk versus benefits of treatment plan with patient and/or family-including medications, labs and radiology that may be ordered. Addressed questions and concerns during visit. Patient and/or family verbalized understanding and agree with plan.    AS THE PROVIDER, I PERSONALLY WORE PPE DURING ENTIRE FACE TO FACE ENCOUNTER IN CLINIC WITH THE PATIENT. PATIENT ALSO WORE PPE DURING ENTIRE FACE TO FACE ENCOUNTER EXCEPT FOR A MAX OF 30 SECONDS DURING NEUROLOGICAL EVALUATION OF CRANIAL NERVES AND THEN MASK WAS PLACED BACK OVER PATIENT FACE FOR REMAINDER OF VISIT. I WASHED MY HANDS BEFORE AND AFTER VISIT.      Cesar Henning MD  10/12/2020         Dr. Chi

## 2024-08-08 NOTE — H&P ADULT - HISTORY OF PRESENT ILLNESS
23-year-old female no past medical history, no past surgical history presents to the emergency department with lower abdominal pain nausea and vomiting since this morning.  Pain now radiates to lower back patient reports symptoms are intermittent but now become constant.  Patient denies dysuria.  Reports chills but no fevers. Patient denies any marijuana use.  Endorses vaping nicotine.  No alcohol use.  23-year-old female no past medical history, no past surgical history presented to the emergency department with 10/10 upper abdominal pain with associated nausea and vomiting since 7pm last night.  Pain now radiates to lower back patient reports symptoms are intermittent but now become constant.  Admits to fever and chills although no temp was taken as well as prefuse sweating at home. Patient denies dysuria. Denies CP, SOB, HA, dizziness, hemoptysis.   23-year-old female w/PMHx of atopic dermatitis and hypotension presented to the emergency department with 10/10 upper abdominal pain with associated nausea and vomiting since 7pm last night.  Pain now radiates to lower back patient reports symptoms are intermittent but now become constant.  Admits to fever and chills although no temp was taken as well as profuse sweating at home. Patient denies dysuria. Denies CP, SOB, HA, dizziness, hemoptysis.

## 2024-08-08 NOTE — H&P ADULT - NSHPLABSRESULTS_GEN_ALL_CORE
LABS:  cret                        13.7   19.11 )-----------( 231      ( 07 Aug 2024 22:27 )             40.9     08-07    139  |  101  |  14  ----------------------------<  144<H>  4.2   |  22  |  1.2    Ca    9.8      07 Aug 2024 22:27    TPro  7.2  /  Alb  4.6  /  TBili  0.6  /  DBili  x   /  AST  15  /  ALT  9   /  AlkPhos  70  08-07      RADIOLOGY:    CT Abdomen and Pelvis w/ Oral Cont and w/ IV Cont (08.08.24 @ 01:11)     IMPRESSION:  Diffuse urinary bladder wall thickening, which could be attributed to   underdistention versus urinary bladder infection/cystitis; correlation   with urinalysis recommended.  Otherwise unremarkable CT abdomen and pelvis.        US Transvaginal (08.07.24 @ 23:24)       IMPRESSION:  No sonographic evidence of acute pelvic pathology.

## 2024-08-08 NOTE — PATIENT PROFILE ADULT - FALL HARM RISK - UNIVERSAL INTERVENTIONS
Bed in lowest position, wheels locked, appropriate side rails in place/Call bell, personal items and telephone in reach/Instruct patient to call for assistance before getting out of bed or chair/Non-slip footwear when patient is out of bed/[place] to call system/Physically safe environment - no spills, clutter or unnecessary equipment/Purposeful Proactive Rounding/Room/bathroom lighting operational, light cord in reach

## 2024-08-08 NOTE — CONSULT NOTE ADULT - SUBJECTIVE AND OBJECTIVE BOX
INFECTIOUS DISEASE CONSULT NOTE    Patient is a 23y old  Female who presents with a chief complaint of N/V (08 Aug 2024 02:32)    HPI:   23-year-old female w/PMHx of atopic dermatitis and hypotension presented to the emergency department with 10/10 upper abdominal pain with associated nausea and vomiting since 7pm last night.  Pain now radiates to lower back patient reports symptoms are intermittent but now become constant.  Admits to fever and chills although no temp was taken as well as profuse sweating at home. Patient denies dysuria. Denies CP, SOB, HA, dizziness, hemoptysis.  (08 Aug 2024 02:32)         Prior hospital charts reviewed [Yes]  Primary team notes reviewed [Yes]  Other consultant notes reviewed [Yes]    REVIEW OF SYSTEMS:      PAST MEDICAL & SURGICAL HISTORY:  Eczema      No significant past surgical history          SOCIAL HISTORY:  - Born in _____, migrated [place] in 19XX  - Currently working as / Retired  - Lives with _____; no pets  - No recent travel  - Denies tobacco use  - Denies alcohol use  - Denies illicit drug use  - Currently sexually active, has one male/female sexual partner    FAMILY HISTORY:      Allergies:  dairy products (Unknown)  No Known Drug Allergies      ANTIMICROBIALS:  cefTRIAXone   IVPB 1000 every 24 hours      ANTIMICROBIALS (past 90 days):  MEDICATIONS  (STANDING):  cefTRIAXone   IVPB   100 mL/Hr IV Intermittent (08-08-24 @ 05:09)        OTHER MEDS:   MEDICATIONS  (STANDING):  acetaminophen     Tablet .. 650 every 6 hours PRN  aluminum hydroxide/magnesium hydroxide/simethicone Suspension 30 every 4 hours PRN  morphine  - Injectable 1 every 4 hours PRN  morphine  - Injectable 2 every 4 hours PRN  ondansetron Injectable 4 every 4 hours PRN  pantoprazole  Injectable 40 every 12 hours      VITALS:  Vital Signs Last 24 Hrs  T(F): 97.3 (08-08-24 @ 06:03), Max: 98.9 (08-08-24 @ 03:29)    Vital Signs Last 24 Hrs  HR: 70 (08-08-24 @ 06:03) (61 - 102)  BP: 118/80 (08-08-24 @ 06:03) (102/67 - 119/77)  RR: 16 (08-08-24 @ 06:03)  SpO2: 97% (08-08-24 @ 06:03) (97% - 100%)  Wt(kg): --    EXAM:    Labs:                        13.0   16.76 )-----------( 201      ( 08 Aug 2024 04:30 )             37.3     08-08    141  |  105  |  17  ----------------------------<  143<H>  4.2   |  21  |  1.7<H>    Ca    9.3      08 Aug 2024 04:30    TPro  7.2  /  Alb  4.6  /  TBili  0.6  /  DBili  x   /  AST  15  /  ALT  9   /  AlkPhos  70  08-07      WBC Trend:  WBC Count: 16.76 (08-08-24 @ 04:30)  WBC Count: 19.11 (08-07-24 @ 22:27)      Auto Neutrophil #: 15.43 K/uL (08-08-24 @ 04:30)  Auto Neutrophil #: 17.31 K/uL (08-07-24 @ 22:27)      Creatine Trend:  Creatinine: 1.7 (08-08)  Creatinine: 1.2 (08-07)      Liver Biochemical Testing Trend:  Alanine Aminotransferase (ALT/SGPT): 9 (08-07)  Aspartate Aminotransferase (AST/SGOT): 15 (08-07-24 @ 22:27)  Bilirubin Total: 0.6 (08-07)      Trend LDH      Auto Eosinophil %: 0.0 % (08-08-24 @ 04:30)  Auto Eosinophil %: 0.0 % (08-07-24 @ 22:27)      Urinalysis Basic - ( 08 Aug 2024 04:30 )    Color: x / Appearance: x / SG: x / pH: x  Gluc: 143 mg/dL / Ketone: x  / Bili: x / Urobili: x   Blood: x / Protein: x / Nitrite: x   Leuk Esterase: x / RBC: x / WBC x   Sq Epi: x / Non Sq Epi: x / Bacteria: x          MICROBIOLOGY:        Urinalysis with Rflx Culture (collected 07 Aug 2024 23:51)      Lactate, Blood: 2.5 (08-08 @ 04:30)          RADIOLOGY:  imaging below personally reviewed    < from: CT Abdomen and Pelvis w/ Oral Cont and w/ IV Cont (08.08.24 @ 01:11) >  FINDINGS:  LOWER CHEST: Unremarkable.    HEPATOBILIARY: Right hepatic subcentimeter hypodensity is too small to   characterize. Unremarkable CT appearance of the gallbladder.    SPLEEN: Unremarkable.    PANCREAS: Unremarkable.    ADRENAL GLANDS: Unremarkable.    KIDNEYS: Symmetric renal enhancement. No hydronephrosis. Right renal cyst.    PELVIC ORGANS: Diffuse urinary bladder wall thickening, which could be   attributed to underdistention versus urinary bladder infection/cystitis;   correlation with urinalysis recommended..    PERITONEUM/MESENTERY/BOWEL: No bowel obstruction. Normal appendix. No   free air. Trace pelvis ascites, nonspecific.    VASCULAR: Normal caliber aorta.    ABDOMINOPELVIC NODES: No enlarged abdominal or pelvic lymph nodes.    BONES/SOFT TISSUES: No acute osseous abnormality.      IMPRESSION:  Diffuse urinary bladder wall thickening, which could be attributed to   underdistention versus urinary bladder infection/cystitis; correlation   with urinalysis recommended.  Otherwise unremarkable CT abdomen and pelvis.    < end of copied text >      < from: US Transvaginal (08.07.24 @ 23:24) >  IMPRESSION:  No sonographic evidence of acute pelvic pathology.    < end of copied text >   INFECTIOUS DISEASE CONSULT NOTE    Patient is a 23y old  Female who presents with a chief complaint of N/V (08 Aug 2024 02:32)    HPI:  23-year-old female w/PMHx of atopic dermatitis and hypotension presented to the emergency department with 10/10 upper abdominal pain with associated nausea and vomiting since 7pm last night.  Pain now radiates to lower back patient reports symptoms are intermittent but now become constant.  Admits to fever and chills although no temp was taken as well as profuse sweating at home. Patient denies dysuria. Denies CP, SOB, HA, dizziness, hemoptysis.  (08 Aug 2024 02:32)       Prior hospital charts reviewed [Yes]  Primary team notes reviewed [Yes]  Other consultant notes reviewed [Yes]    REVIEW OF SYSTEMS:  CONSTITUTIONAL: No fever or chills  HEAD: No lesion on scalp  EYES: No visual disturbance  ENT: No sore throat  RESPIRATORY: No cough, no shortness of breath  CARDIOVASCULAR: No chest pain or palpitations  GASTROINTESTINAL: + lower abdominal pain, epigastric pain; + nausea  GENITOURINARY: No dysuria  NEUROLOGICAL: No headache/dizziness  MUSCULOSKELETAL: No joint pain, erythema, or swelling; no back pain  SKIN: No itching, rashes  All other ROS negative except noted above      PAST MEDICAL & SURGICAL HISTORY:  Eczema      No significant past surgical history      SOCIAL HISTORY:  - She is teacher, 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 650 every 6 hours PRN  aluminum hydroxide/magnesium hydroxide/simethicone Suspension 30 every 4 hours PRN  morphine  - Injectable 1 every 4 hours PRN  morphine  - Injectable 2 every 4 hours PRN  ondansetron Injectable 4 every 4 hours PRN  pantoprazole  Injectable 40 every 12 hours      VITALS:  Vital Signs Last 24 Hrs  T(F): 97.3 (08-08-24 @ 06:03), Max: 98.9 (08-08-24 @ 03:29)    Vital Signs Last 24 Hrs  HR: 70 (08-08-24 @ 06:03) (61 - 102)  BP: 118/80 (08-08-24 @ 06:03) (102/67 - 119/77)  RR: 16 (08-08-24 @ 06:03)  SpO2: 97% (08-08-24 @ 06:03) (97% - 100%)  Wt(kg): --    EXAM:  GENERAL: NAD, lying in bed  HEAD: No head lesions  EYES: Conjunctiva pink and cornea white  EAR, NOSE, MOUTH, THROAT: Normal external ears and nose, no discharges; moist mucous membranes  NECK: Supple, nontender to palpation; no JVD  RESPIRATORY: Clear to auscultation bilaterally  CARDIOVASCULAR: S1 S2  GASTROINTESTINAL: Soft, nontender, nondistended; normoactive bowel sounds  GENITOURINARY: No londono catheter, no CVA tenderness  EXTREMITIES: No clubbing, cyanosis, or petal edema  NERVOUS SYSTEM: Alert and oriented to person, time, place and situation, speech clear. No focal deficits   MUSCULOSKELETAL: No joint erythema, swelling or pain  SKIN: No rashes or lesions, no superficial thrombophlebitis  PSYCH: Normal affect    Labs:                        13.0   16.76 )-----------( 201      ( 08 Aug 2024 04:30 )             37.3     08-08    141  |  105  |  17  ----------------------------<  143<H>  4.2   |  21  |  1.7<H>    Ca    9.3      08 Aug 2024 04:30    TPro  7.2  /  Alb  4.6  /  TBili  0.6  /  DBili  x   /  AST  15  /  ALT  9   /  AlkPhos  70  08-07      WBC Trend:  WBC Count: 16.76 (08-08-24 @ 04:30)  WBC Count: 19.11 (08-07-24 @ 22:27)      Auto Neutrophil #: 15.43 K/uL (08-08-24 @ 04:30)  Auto Neutrophil #: 17.31 K/uL (08-07-24 @ 22:27)      Creatine Trend:  Creatinine: 1.7 (08-08)  Creatinine: 1.2 (08-07)      Liver Biochemical Testing Trend:  Alanine Aminotransferase (ALT/SGPT): 9 (08-07)  Aspartate Aminotransferase (AST/SGOT): 15 (08-07-24 @ 22:27)  Bilirubin Total: 0.6 (08-07)      Trend LDH      Auto Eosinophil %: 0.0 % (08-08-24 @ 04:30)  Auto Eosinophil %: 0.0 % (08-07-24 @ 22:27)      Urinalysis Basic - ( 08 Aug 2024 04:30 )    Color: x / Appearance: x / SG: x / pH: x  Gluc: 143 mg/dL / Ketone: x  / Bili: x / Urobili: x   Blood: x / Protein: x / Nitrite: x   Leuk Esterase: x / RBC: x / WBC x   Sq Epi: x / Non Sq Epi: x / Bacteria: x          MICROBIOLOGY:        Urinalysis with Rflx Culture (collected 07 Aug 2024 23:51)      Lactate, Blood: 2.5 (08-08 @ 04:30)          RADIOLOGY:  imaging below personally reviewed    < from: CT Abdomen and Pelvis w/ Oral Cont and w/ IV Cont (08.08.24 @ 01:11) >  FINDINGS:  LOWER CHEST: Unremarkable.    HEPATOBILIARY: Right hepatic subcentimeter hypodensity is too small to   characterize. Unremarkable CT appearance of the gallbladder.    SPLEEN: Unremarkable.    PANCREAS: Unremarkable.    ADRENAL GLANDS: Unremarkable.    KIDNEYS: Symmetric renal enhancement. No hydronephrosis. Right renal cyst.    PELVIC ORGANS: Diffuse urinary bladder wall thickening, which could be   attributed to underdistention versus urinary bladder infection/cystitis;   correlation with urinalysis recommended..    PERITONEUM/MESENTERY/BOWEL: No bowel obstruction. Normal appendix. No   free air. Trace pelvis ascites, nonspecific.    VASCULAR: Normal caliber aorta.    ABDOMINOPELVIC NODES: No enlarged abdominal or pelvic lymph nodes.    BONES/SOFT TISSUES: No acute osseous abnormality.      IMPRESSION:  Diffuse urinary bladder wall thickening, which could be attributed to   underdistention versus urinary bladder infection/cystitis; correlation   with urinalysis recommended.  Otherwise unremarkable CT abdomen and pelvis.    < end of copied text >      < from: US Transvaginal (08.07.24 @ 23:24) >  IMPRESSION:  No sonographic evidence of acute pelvic pathology.    < end of copied text >   INFECTIOUS DISEASE CONSULT NOTE    Patient is a 23y old  Female who presents with a chief complaint of N/V (08 Aug 2024 02:32)    HPI:  23-year-old female w/PMHx of atopic dermatitis and hypotension presented to the emergency department with 10/10 upper abdominal pain with associated nausea and vomiting since 7pm last night.  Pain now radiates to lower back patient reports symptoms are intermittent but now become constant.  Admits to fever and chills although no temp was taken as well as profuse sweating at home. Patient denies dysuria. Denies CP, SOB, HA, dizziness, hemoptysis.  (08 Aug 2024 02:32)       Prior hospital charts reviewed [Yes]  Primary team notes reviewed [Yes]  Other consultant notes reviewed [Yes]    REVIEW OF SYSTEMS:  CONSTITUTIONAL: No fever or chills  HEAD: No lesion on scalp  EYES: No visual disturbance  ENT: No sore throat  RESPIRATORY: No cough, no shortness of breath  CARDIOVASCULAR: No chest pain or palpitations  GASTROINTESTINAL: + lower abdominal pain, epigastric pain; + nausea  GENITOURINARY: No dysuria  NEUROLOGICAL: No headache/dizziness  MUSCULOSKELETAL: No joint pain, erythema, or swelling; no back pain  SKIN: No itching, rashes  All other ROS negative except noted above      PAST MEDICAL & SURGICAL HISTORY:  Eczema      No significant past surgical history      SOCIAL HISTORY:  - She is teacher, 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 650 every 6 hours PRN  aluminum hydroxide/magnesium hydroxide/simethicone Suspension 30 every 4 hours PRN  morphine  - Injectable 1 every 4 hours PRN  morphine  - Injectable 2 every 4 hours PRN  ondansetron Injectable 4 every 4 hours PRN  pantoprazole  Injectable 40 every 12 hours      VITALS:  Vital Signs Last 24 Hrs  T(F): 97.3 (08-08-24 @ 06:03), Max: 98.9 (08-08-24 @ 03:29)    Vital Signs Last 24 Hrs  HR: 70 (08-08-24 @ 06:03) (61 - 102)  BP: 118/80 (08-08-24 @ 06:03) (102/67 - 119/77)  RR: 16 (08-08-24 @ 06:03)  SpO2: 97% (08-08-24 @ 06:03) (97% - 100%)  Wt(kg): --    EXAM:  GENERAL: NAD, lying in bed  HEAD: No head lesions  EYES: Conjunctiva pink and cornea white  EAR, NOSE, MOUTH, THROAT: Normal external ears and nose, no discharges; moist mucous membranes  NECK: Supple, nontender to palpation; no JVD  RESPIRATORY: Clear to auscultation bilaterally  CARDIOVASCULAR: S1 S2  GASTROINTESTINAL: Soft, + epigastric tenderness, LLQ tenderness, nondistended; normoactive bowel sounds  GENITOURINARY: No londono catheter, no CVA tenderness  EXTREMITIES: No clubbing, cyanosis, or petal edema  NERVOUS SYSTEM: Alert and oriented to person, time, place and situation, speech clear. No focal deficits   MUSCULOSKELETAL: No joint erythema, swelling or pain  SKIN: No rashes or lesions, no superficial thrombophlebitis  PSYCH: Normal affect    Labs:                        13.0   16.76 )-----------( 201      ( 08 Aug 2024 04:30 )             37.3     08-08    141  |  105  |  17  ----------------------------<  143<H>  4.2   |  21  |  1.7<H>    Ca    9.3      08 Aug 2024 04:30    TPro  7.2  /  Alb  4.6  /  TBili  0.6  /  DBili  x   /  AST  15  /  ALT  9   /  AlkPhos  70  08-07      WBC Trend:  WBC Count: 16.76 (08-08-24 @ 04:30)  WBC Count: 19.11 (08-07-24 @ 22:27)      Auto Neutrophil #: 15.43 K/uL (08-08-24 @ 04:30)  Auto Neutrophil #: 17.31 K/uL (08-07-24 @ 22:27)      Creatine Trend:  Creatinine: 1.7 (08-08)  Creatinine: 1.2 (08-07)      Liver Biochemical Testing Trend:  Alanine Aminotransferase (ALT/SGPT): 9 (08-07)  Aspartate Aminotransferase (AST/SGOT): 15 (08-07-24 @ 22:27)  Bilirubin Total: 0.6 (08-07)      Trend LDH      Auto Eosinophil %: 0.0 % (08-08-24 @ 04:30)  Auto Eosinophil %: 0.0 % (08-07-24 @ 22:27)      Urinalysis Basic - ( 08 Aug 2024 04:30 )    Color: x / Appearance: x / SG: x / pH: x  Gluc: 143 mg/dL / Ketone: x  / Bili: x / Urobili: x   Blood: x / Protein: x / Nitrite: x   Leuk Esterase: x / RBC: x / WBC x   Sq Epi: x / Non Sq Epi: x / Bacteria: x          MICROBIOLOGY:        Urinalysis with Rflx Culture (collected 07 Aug 2024 23:51)      Lactate, Blood: 2.5 (08-08 @ 04:30)          RADIOLOGY:  imaging below personally reviewed    < from: CT Abdomen and Pelvis w/ Oral Cont and w/ IV Cont (08.08.24 @ 01:11) >  FINDINGS:  LOWER CHEST: Unremarkable.    HEPATOBILIARY: Right hepatic subcentimeter hypodensity is too small to   characterize. Unremarkable CT appearance of the gallbladder.    SPLEEN: Unremarkable.    PANCREAS: Unremarkable.    ADRENAL GLANDS: Unremarkable.    KIDNEYS: Symmetric renal enhancement. No hydronephrosis. Right renal cyst.    PELVIC ORGANS: Diffuse urinary bladder wall thickening, which could be   attributed to underdistention versus urinary bladder infection/cystitis;   correlation with urinalysis recommended..    PERITONEUM/MESENTERY/BOWEL: No bowel obstruction. Normal appendix. No   free air. Trace pelvis ascites, nonspecific.    VASCULAR: Normal caliber aorta.    ABDOMINOPELVIC NODES: No enlarged abdominal or pelvic lymph nodes.    BONES/SOFT TISSUES: No acute osseous abnormality.      IMPRESSION:  Diffuse urinary bladder wall thickening, which could be attributed to   underdistention versus urinary bladder infection/cystitis; correlation   with urinalysis recommended.  Otherwise unremarkable CT abdomen and pelvis.    < end of copied text >      < from: US Transvaginal (08.07.24 @ 23:24) >  IMPRESSION:  No sonographic evidence of acute pelvic pathology.    < end of copied text >

## 2024-08-08 NOTE — CONSULT NOTE ADULT - SUBJECTIVE AND OBJECTIVE BOX
Chief complaint/Reason for consult: n/v    HPI:   23-year-old female w/PMHx of atopic dermatitis and hypotension presented to the emergency department with 10/10 upper abdominal pain with associated nausea and vomiting since 7pm last night.  Pain now radiates to lower back patient reports symptoms are intermittent but now become constant.  Admits to fever and chills although no temp was taken as well as profuse sweating at home. Patient denies dysuria. Denies CP, SOB, HA, dizziness, hemoptysis.  (08 Aug 2024 02:32)    GI Updates: 23[facility] ecz[facility] and hypotension presents for n/v the past few days and sweating. Patient reports she had some lower abdominal pain that has now resolved. N/v resolved, no sick contacts, N-said use. Currently Patient denies nausea, vomiting, hematemesis, melena, blood in stool, diarrhea, constipation, abdominal pain.      PAST MEDICAL & SURGICAL HISTORY:   Eczema  No significant past surgical history      Family history:  FAMILY HISTORY:    No GI cancers in first or second degree relatives    Social History: +vaping tobacco smoking. No alcohol. No illegal drug use.    Allergies:   dairy products (Unknown)  No Known Drug Allergies  Intolerances      MEDICATIONS: Home Medications:  Dupixent Pre-filled Syringe:  (20 Jan 2023 00:30)    MEDICATIONS  (STANDING):  cefTRIAXone   IVPB 1000 milliGRAM(s) IV Intermittent every 24 hours  pantoprazole  Injectable 40 milliGRAM(s) IV Push every 12 hours  sodium chloride 0.9%. 1000 milliLiter(s) (100 mL/Hr) IV Continuous <Continuous>    MEDICATIONS  (PRN):  acetaminophen     Tablet .. 650 milliGRAM(s) Oral every 6 hours PRN Temp greater or equal to 38C (100.4F), Mild Pain (1 - 3)  aluminum hydroxide/magnesium hydroxide/simethicone Suspension 30 milliLiter(s) Oral every 4 hours PRN Dyspepsia  morphine  - Injectable 2 milliGRAM(s) IV Push every 4 hours PRN Severe Pain (7 - 10)  morphine  - Injectable 1 milliGRAM(s) IV Push every 4 hours PRN Moderate Pain (4 - 6)  ondansetron Injectable 4 milliGRAM(s) IV Push every 4 hours PRN Nausea and/or Vomiting        REVIEW OF SYSTEMS  General:  No weight loss, fevers, or chills.  Eyes:  No reported pain or visual changes  ENT:  No sore throat or runny nose.  NECK: No stiffness or lymphadenopathy  CV:  No chest pain or palpitations.  Resp:  No shortness of breath, cough, wheezing or hemoptysis  GI:  No abdominal pain, nausea, vomiting, dysphagia, diarrhea or constipation. No rectal bleeding, melena, or hematemesis.  Muscle:  No aches or weakness  Neuro:  No tingling, numbness       VITALS:   T(F): 97.3 (08-08-24 @ 06:03), Max: 98.9 (08-08-24 @ 03:29)  HR: 70 (08-08-24 @ 06:03) (61 - 102)  BP: 118/80 (08-08-24 @ 06:03) (102/67 - 119/77)  RR: 16 (08-08-24 @ 06:03) (16 - 18)  SpO2: 97% (08-08-24 @ 06:03) (97% - 100%)    PHYSICAL EXAM:  GENERAL: AAOx3, no acute distress.  HEAD:  Atraumatic, Normocephalic  EYES: conjunctiva and sclera clear  NECK: Supple, No thyromegaly   CHEST/LUNG: Clear to auscultation bilaterally; No wheeze, rhonchi, or rales  HEART: Regular rate and rhythm; normal S1, S2, No murmurs.  ABDOMEN: Soft, nontender, nondistended; Bowel sounds present  NEUROLOGY: No asterixis or tremor  SKIN: Intact, no jaundice          LABS:  08-08    141  |  105  |  17  ----------------------------<  143<H>  4.2   |  21  |  1.7<H>    Ca    9.3      08 Aug 2024 04:30    TPro  7.2  /  Alb  4.6  /  TBili  0.6  /  DBili  x   /  AST  15  /  ALT  9   /  AlkPhos  70  08-07                          13.0   16.76 )-----------( 201      ( 08 Aug 2024 04:30 )             37.3     LIVER FUNCTIONS - ( 07 Aug 2024 22:27 )  Alb: 4.6 g/dL / Pro: 7.2 g/dL / ALK PHOS: 70 U/L / ALT: 9 U/L / AST: 15 U/L / GGT: x               IMAGING:    < from: CT Abdomen and Pelvis w/ Oral Cont and w/ IV Cont (08.08.24 @ 01:11) >    ACC: [id number] EXAM:  CT ABDOMEN AND PELVIS OC IC   ORDERED BY: ZHENG JOHNSON     PROCEDURE DATE:  08/08/2024          INTERPRETATION:  CLINICAL HISTORY: Left lower quadrant pain..    TECHNIQUE: Contiguous axial CT images were obtained of the abdomen and   pelvis following the administration of oral and intravenous contrast.   Sagittal, coronal, and MIP reformats were performed.    CONTRAST/COMPLICATIONS:  IV Contrast: Omnipaque 350   95 cc administered   5 cc discarded  Oral Contrast: Was administered.    COMPARISON: None.      FINDINGS:  LOWER CHEST: Unremarkable.    HEPATOBILIARY: Right hepatic subcentimeter hypodensity is too small to   characterize. Unremarkable CT appearance of the gallbladder.    SPLEEN: Unremarkable.    PANCREAS: Unremarkable.    ADRENAL GLANDS: Unremarkable.    KIDNEYS: Symmetric renal enhancement. No hydronephrosis. Right renal cyst.    PELVIC ORGANS: Diffuse urinary bladder wall thickening, which could be   attributed to underdistention versus urinary bladder infection/cystitis;   correlation with urinalysis recommended..    PERITONEUM/MESENTERY/BOWEL: No bowel obstruction. Normal appendix. No   free air. Trace pelvis ascites, nonspecific.    VASCULAR: Normal caliber aorta.    ABDOMINOPELVIC NODES: No enlarged abdominal or pelvic lymph nodes.    BONES/SOFT TISSUES: No acute osseous abnormality.      IMPRESSION:  Diffuse urinary bladder wall thickening, which could be attributed to   underdistention versus urinary bladder infection/cystitis; correlation   with urinalysis recommended.  Otherwise unremarkable CT abdomen and pelvis.    --- End of Report ---          ARCADIO MARADIAGA MD; Resident Radiologist  This document has been electronically signed.  JENNIFER RICKETTS MD; Attending Radiologist  This document has been electronically signed. Aug  8 2024  1:37AM    < end of copied text >

## 2024-08-09 LAB
ANION GAP SERPL CALC-SCNC: 12 MMOL/L — SIGNIFICANT CHANGE UP (ref 7–14)
BUN SERPL-MCNC: 16 MG/DL — SIGNIFICANT CHANGE UP (ref 10–20)
CALCIUM SERPL-MCNC: 8.7 MG/DL — SIGNIFICANT CHANGE UP (ref 8.4–10.5)
CHLORIDE SERPL-SCNC: 102 MMOL/L — SIGNIFICANT CHANGE UP (ref 98–110)
CO2 SERPL-SCNC: 23 MMOL/L — SIGNIFICANT CHANGE UP (ref 17–32)
CREAT SERPL-MCNC: 1.3 MG/DL — SIGNIFICANT CHANGE UP (ref 0.7–1.5)
CULTURE RESULTS: SIGNIFICANT CHANGE UP
EGFR: 59 ML/MIN/1.73M2 — LOW
GLUCOSE SERPL-MCNC: 95 MG/DL — SIGNIFICANT CHANGE UP (ref 70–99)
HCT VFR BLD CALC: 36.8 % — LOW (ref 37–47)
HGB BLD-MCNC: 12.5 G/DL — SIGNIFICANT CHANGE UP (ref 12–16)
LACTATE SERPL-SCNC: 1.3 MMOL/L — SIGNIFICANT CHANGE UP (ref 0.7–2)
MCHC RBC-ENTMCNC: 30.5 PG — SIGNIFICANT CHANGE UP (ref 27–31)
MCHC RBC-ENTMCNC: 34 G/DL — SIGNIFICANT CHANGE UP (ref 32–37)
MCV RBC AUTO: 89.8 FL — SIGNIFICANT CHANGE UP (ref 81–99)
NRBC # BLD: 0 /100 WBCS — SIGNIFICANT CHANGE UP (ref 0–0)
PLATELET # BLD AUTO: 157 K/UL — SIGNIFICANT CHANGE UP (ref 130–400)
PMV BLD: 11.9 FL — HIGH (ref 7.4–10.4)
POTASSIUM SERPL-MCNC: 3.7 MMOL/L — SIGNIFICANT CHANGE UP (ref 3.5–5)
POTASSIUM SERPL-SCNC: 3.7 MMOL/L — SIGNIFICANT CHANGE UP (ref 3.5–5)
RBC # BLD: 4.1 M/UL — LOW (ref 4.2–5.4)
RBC # FLD: 12.7 % — SIGNIFICANT CHANGE UP (ref 11.5–14.5)
SODIUM SERPL-SCNC: 137 MMOL/L — SIGNIFICANT CHANGE UP (ref 135–146)
SPECIMEN SOURCE: SIGNIFICANT CHANGE UP
WBC # BLD: 11.9 K/UL — HIGH (ref 4.8–10.8)
WBC # FLD AUTO: 11.9 K/UL — HIGH (ref 4.8–10.8)

## 2024-08-09 PROCEDURE — 99239 HOSP IP/OBS DSCHRG MGMT >30: CPT

## 2024-08-09 PROCEDURE — 99223 1ST HOSP IP/OBS HIGH 75: CPT

## 2024-08-09 RX ORDER — METOCLOPRAMIDE HCL 5 MG/ML
5 VIAL (ML) INJECTION EVERY 8 HOURS
Refills: 0 | Status: DISCONTINUED | OUTPATIENT
Start: 2024-08-09 | End: 2024-08-10

## 2024-08-09 RX ADMIN — Medication 1 MILLIGRAM(S): at 16:01

## 2024-08-09 RX ADMIN — PANTOPRAZOLE SODIUM 40 MILLIGRAM(S): 20 TABLET, DELAYED RELEASE ORAL at 17:11

## 2024-08-09 RX ADMIN — Medication 1 MILLIGRAM(S): at 14:54

## 2024-08-09 RX ADMIN — PANTOPRAZOLE SODIUM 40 MILLIGRAM(S): 20 TABLET, DELAYED RELEASE ORAL at 05:29

## 2024-08-09 RX ADMIN — Medication 4 MILLIGRAM(S): at 17:52

## 2024-08-09 RX ADMIN — Medication 5 MILLIGRAM(S): at 14:54

## 2024-08-09 NOTE — DISCHARGE NOTE PROVIDER - NSDCCAREPROVSEEN_GEN_ALL_CORE_FT
Khurram Mishra Tad, Khurram Andrea, Diamond Bahena, Shahrzad Muse, Osmin Chi, Edmund Ruby, Cirilo Valentine, Itzel Graves

## 2024-08-09 NOTE — PROGRESS NOTE ADULT - TIME BILLING
I have personally seen and examined this patient.    I have reviewed all pertinent clinical information and reviewed all relevant imaging and diagnostic studies personally.   I discussed recommendations with the primary team.
Coordination of care

## 2024-08-09 NOTE — CHART NOTE - NSCHARTNOTEFT_GEN_A_CORE
Patient was planned for dc home today; but was not able to tolerate soft diet; patient vomiting at the sight of food.   -will order reglan   -will cancel dc order  -will downgrade diet to clears again

## 2024-08-09 NOTE — DISCHARGE NOTE PROVIDER - HOSPITAL COURSE
23-year-old female no past medical history, no past surgical history presented to the emergency department with 10/10 upper abdominal pain with associated nausea and vomiting since 7pm last night. Patient was admitted for abdominal pain, nausea and vomiting thought to be likely due to viral gastroenteritis. Her serum pregnancy neg;  WBC 19, afebrile; UA neg. CT abd showed Diffuse urinary bladder wall thickening, which could be attributed to underdistention versus urinary bladder infection/cystitis; correlation with urinalysis recommended. Her urinalysis was negative. Infectious disease evaluated her. Gastroenterologist evaluated her. Her diet was advanced. She was treated with supportive care. At this time patient's WBC trended down, her ERICH resolved, her lactate trended down and clinically she is improved. She is medically stable for a hospital discharge.    Things to follow up:  -PCP follow up in 1-2 weeks  -Drink 6-8 glasses of water daily    Patient was seen and examined at bedside; patient reports improvement in her symptoms. Discharge planning was discussed.   Vital Signs Last 24 Hrs  T(C): 37 (09 Aug 2024 04:04), Max: 37.2 (08 Aug 2024 13:10)  T(F): 98.6 (09 Aug 2024 04:04), Max: 98.9 (08 Aug 2024 13:10)  HR: 72 (09 Aug 2024 04:04) (67 - 75)  BP: 96/62 (09 Aug 2024 04:04) (93/54 - 116/68)  BP(mean): --  RR: 18 (09 Aug 2024 04:04) (18 - 18)  SpO2: 100% (08 Aug 2024 21:00) (99% - 100%)    Parameters below as of 08 Aug 2024 21:00  Patient On (Oxygen Delivery Method): room air    GENERAL: NAD, well-groomed, well-developed  HEAD:  Atraumatic, Normocephalic  EYES: EOMI, PERRLA, conjunctiva and sclera clear  ENMT: No tonsillar erythema, exudates, or enlargement; Moist mucous membranes  NECK: Supple, No JVD, Normal thyroid  NERVOUS SYSTEM:  Alert & Oriented X3, Good concentration; Motor Strength 5/5 B/L upper and lower extremities  CHEST/LUNG: Clear to percussion bilaterally; No rales, rhonchi, wheezing, or rubs  HEART: Regular rate and rhythm; No murmurs, rubs, or gallops  ABDOMEN: Soft, Nontender, Nondistended; Bowel sounds present  EXTREMITIES:  2+ Peripheral Pulses, No clubbing, cyanosis, or edema

## 2024-08-09 NOTE — DISCHARGE NOTE PROVIDER - ATTENDING DISCHARGE PHYSICAL EXAMINATION:
GENERAL: NAD, well-groomed, well-developed  HEAD:  Atraumatic, Normocephalic  EYES: EOMI, PERRLA, conjunctiva and sclera clear  ENMT: No tonsillar erythema, exudates, or enlargement; Moist mucous membranes  NECK: Supple, No JVD, Normal thyroid  NERVOUS SYSTEM:  Alert & Oriented X3, Good concentration; Motor Strength 5/5 B/L upper and lower extremities  CHEST/LUNG: Clear to percussion bilaterally; No rales, rhonchi, wheezing, or rubs  HEART: Regular rate and rhythm; No murmurs, rubs, or gallops  ABDOMEN: Soft, Nontender, Nondistended; Bowel sounds present  EXTREMITIES:  2+ Peripheral Pulses, No clubbing, cyanosis, or edema

## 2024-08-09 NOTE — DISCHARGE NOTE PROVIDER - NSDCCPCAREPLAN_GEN_ALL_CORE_FT
PRINCIPAL DISCHARGE DIAGNOSIS  Diagnosis: Nausea and vomiting  Assessment and Plan of Treatment: Presented to the emergency department with 10/10 upper abdominal pain with associated nausea and vomiting since 7pm last night. Patient was admitted for abdominal pain, nausea and vomiting thought to be likely due to viral gastroenteritis. Her serum pregnancy neg;  WBC 19, afebrile; UA neg. CT abd showed Diffuse urinary bladder wall thickening, which could be attributed to underdistention versus urinary bladder infection/cystitis; correlation with urinalysis recommended. Her urinalysis was negative. Infectious disease evaluated her. Gastroenterologist evaluated her. Her diet was advanced. She was treated with supportive care. At this time patient's WBC trended down, her ERICH resolved, her lactate trended down and clinically she is improved. She is medically stable for a hospital discharge.  Things to follow up:  -PCP follow up in 1-2 weeks  -Drink 6-8 glasses of water daily      SECONDARY DISCHARGE DIAGNOSES  Diagnosis: Abdominal pain  Assessment and Plan of Treatment:

## 2024-08-09 NOTE — DISCHARGE NOTE PROVIDER - NSDCMRMEDTOKEN_GEN_ALL_CORE_FT
Dupixent Pre-filled Syringe:    Dupixent Pre-filled Syringe:   ondansetron 4 mg oral tablet, disintegratin tab(s) orally 3 times a day  Protonix 40 mg oral delayed release tablet: 1 tab(s) orally once a day

## 2024-08-10 VITALS
HEART RATE: 72 BPM | DIASTOLIC BLOOD PRESSURE: 66 MMHG | SYSTOLIC BLOOD PRESSURE: 168 MMHG | TEMPERATURE: 98 F | OXYGEN SATURATION: 98 %

## 2024-08-10 PROCEDURE — 99232 SBSQ HOSP IP/OBS MODERATE 35: CPT

## 2024-08-10 PROCEDURE — 99233 SBSQ HOSP IP/OBS HIGH 50: CPT

## 2024-08-10 RX ORDER — ONDANSETRON HCL/PF 4 MG/2 ML
1 VIAL (ML) INJECTION
Qty: 21 | Refills: 0
Start: 2024-08-10 | End: 2024-08-16

## 2024-08-10 RX ORDER — PANTOPRAZOLE SODIUM 20 MG/1
1 TABLET, DELAYED RELEASE ORAL
Qty: 30 | Refills: 0
Start: 2024-08-10 | End: 2024-09-08

## 2024-08-10 RX ADMIN — Medication 5 MILLIGRAM(S): at 06:10

## 2024-08-10 RX ADMIN — PANTOPRAZOLE SODIUM 40 MILLIGRAM(S): 20 TABLET, DELAYED RELEASE ORAL at 06:10

## 2024-08-10 NOTE — PROGRESS NOTE ADULT - SUBJECTIVE AND OBJECTIVE BOX
FIGUEROAANNI  23y  Female      Patient is a 23y old  Female who presents with a chief complaint of N/V (08 Aug 2024 11:58)      INTERVAL HPI/OVERNIGHT EVENTS:  Patient seen and examined earlier this morning; patient reports of abd pain; denies any new complaints. Denies F/C, CP, palpitations, SOB. Reports of abdominal pain associated with N/V.       REVIEW OF SYSTEMS:  CONSTITUTIONAL: No fever, weight loss, or fatigue  EYES: No eye pain, visual disturbances, or discharge  ENMT:  No difficulty hearing, tinnitus, vertigo; No sinus or throat pain  NECK: No pain or stiffness  RESPIRATORY: No cough, wheezing, chills or hemoptysis; No shortness of breath  CARDIOVASCULAR: No chest pain, palpitations, dizziness, or leg swelling  GASTROINTESTINAL: +abd pain, +N/V, or hematemesis; No diarrhea or constipation. No melena or hematochezia.  GENITOURINARY: No dysuria, frequency, hematuria, or incontinence  NEUROLOGICAL: No headaches, memory loss, loss of strength, numbness, or tremors  MUSCULOSKELETAL: No joint pain or swelling; No muscle, back, or extremity pain      T(C): 36.3 (08-08-24 @ 06:03), Max: 37.2 (08-08-24 @ 03:29)  HR: 70 (08-08-24 @ 06:03) (61 - 102)  BP: 118/80 (08-08-24 @ 06:03) (102/67 - 119/77)  RR: 16 (08-08-24 @ 06:03) (16 - 18)  SpO2: 97% (08-08-24 @ 06:03) (97% - 100%)    PHYSICAL EXAM:  GENERAL: NAD, well-groomed, well-developed  HEAD:  Atraumatic, Normocephalic  EYES: EOMI, PERRLA, conjunctiva and sclera clear  ENMT: No tonsillar erythema, exudates, or enlargement; Moist mucous membranes  NECK: Supple, No JVD, Normal thyroid  NERVOUS SYSTEM:  Alert & Oriented X3, Good concentration; Motor Strength 5/5 B/L upper and lower extremities  CHEST/LUNG: Clear to percussion bilaterally; No rales, rhonchi, wheezing, or rubs  HEART: Regular rate and rhythm; No murmurs, rubs, or gallops  ABDOMEN: Soft, Nontender, Nondistended; Bowel sounds present  EXTREMITIES:  2+ Peripheral Pulses, No clubbing, cyanosis, or edema      Consultant(s) Notes Reviewed:  [x ] YES  [ ] NO  Care Discussed with Consultants/Other Providers [ x] YES  [ ] NO    LAB:                        13.0   16.76 )-----------( 201      ( 08 Aug 2024 04:30 )             37.3     08-08    141  |  105  |  17  ----------------------------<  143<H>  4.2   |  21  |  1.7<H>    Ca    9.3      08 Aug 2024 04:30    TPro  7.2  /  Alb  4.6  /  TBili  0.6  /  DBili  x   /  AST  15  /  ALT  9   /  AlkPhos  70  08-07    LIVER FUNCTIONS - ( 07 Aug 2024 22:27 )  Alb: 4.6 g/dL / Pro: 7.2 g/dL / ALK PHOS: 70 U/L / ALT: 9 U/L / AST: 15 U/L / GGT: x                     Urinalysis with Rflx Culture (collected 08-07-24 @ 23:51)        Urinalysis with Rflx Culture (collected 08-07-24 @ 23:51)      Drug Dosing Weight  Height (cm): 162.6 (08 Aug 2024 03:29)  Weight (kg): 47.6 (07 Aug 2024 22:03)  BMI (kg/m2): 18 (08 Aug 2024 03:29)  BSA (m2): 1.49 (08 Aug 2024 03:29)  Height (cm): 162.6 (08-08-24 @ 03:29)  Weight (kg): 47.6 (08-07-24 @ 22:03)  BMI (kg/m2): 18 (08-08-24 @ 03:29)  BSA (m2): 1.49 (08-08-24 @ 03:29)  CAPILLARY BLOOD GLUCOSE        I&O's Summary    Urinalysis Basic - ( 08 Aug 2024 04:30 )    Color: x / Appearance: x / SG: x / pH: x  Gluc: 143 mg/dL / Ketone: x  / Bili: x / Urobili: x   Blood: x / Protein: x / Nitrite: x   Leuk Esterase: x / RBC: x / WBC x   Sq Epi: x / Non Sq Epi: x / Bacteria: x        RADIOLOGY & ADDITIONAL TESTS:  Imaging Personally Reviewed:  [x] YES  [ ] NO    HEALTH ISSUES - PROBLEM Dx:          MEDS:  acetaminophen     Tablet .. 650 milliGRAM(s) Oral every 6 hours PRN  aluminum hydroxide/magnesium hydroxide/simethicone Suspension 30 milliLiter(s) Oral every 4 hours PRN  morphine  - Injectable 1 milliGRAM(s) IV Push every 4 hours PRN  morphine  - Injectable 2 milliGRAM(s) IV Push every 4 hours PRN  ondansetron Injectable 4 milliGRAM(s) IV Push every 4 hours PRN  pantoprazole  Injectable 40 milliGRAM(s) IV Push every 12 hours  sodium chloride 0.9%. 1000 milliLiter(s) IV Continuous <Continuous>        
PROGRESS NOTE:   Osmin Muse MD  Available on teams    Patient is a 23y old  Female who presents with a chief complaint of N/V (09 Aug 2024 11:47)      SUBJECTIVE / OVERNIGHT EVENTS:  Patient seen and examined. Patient with episodes of n/v yesterday so DC was held. Patient tolerating food today. Will plan for DC today.     ADDITIONAL REVIEW OF SYSTEMS:    MEDICATIONS  (STANDING):  pantoprazole  Injectable 40 milliGRAM(s) IV Push every 12 hours  sodium chloride 0.9%. 1000 milliLiter(s) (100 mL/Hr) IV Continuous <Continuous>    MEDICATIONS  (PRN):  acetaminophen     Tablet .. 650 milliGRAM(s) Oral every 6 hours PRN Temp greater or equal to 38C (100.4F), Mild Pain (1 - 3)  aluminum hydroxide/magnesium hydroxide/simethicone Suspension 30 milliLiter(s) Oral every 4 hours PRN Dyspepsia  metoclopramide Injectable 5 milliGRAM(s) IV Push every 8 hours PRN nausea and vomiting  morphine  - Injectable 2 milliGRAM(s) IV Push every 4 hours PRN Severe Pain (7 - 10)  morphine  - Injectable 1 milliGRAM(s) IV Push every 4 hours PRN Moderate Pain (4 - 6)  ondansetron Injectable 4 milliGRAM(s) IV Push every 4 hours PRN Nausea and/or Vomiting      CAPILLARY BLOOD GLUCOSE        I&O's Summary      PHYSICAL EXAM:  Vital Signs Last 24 Hrs  T(C): 36.7 (10 Aug 2024 04:58), Max: 36.9 (09 Aug 2024 21:00)  T(F): 98 (10 Aug 2024 04:58), Max: 98.5 (09 Aug 2024 21:00)  HR: 72 (10 Aug 2024 04:58) (58 - 72)  BP: 127/86 (10 Aug 2024 04:58) (103/70 - 127/86)  BP(mean): --  RR: 18 (09 Aug 2024 21:00) (18 - 18)  SpO2: 99% (10 Aug 2024 04:58) (99% - 100%)    Parameters below as of 09 Aug 2024 21:00  Patient On (Oxygen Delivery Method): room air        GENERAL: No acute distress, well-developed  HEAD:  Atraumatic, Normocephalic  EYES: EOMI, PERRLA, conjunctiva and sclera clear  NECK: Supple, no lymphadenopathy, no JVD  CHEST/LUNG: CTAB; No wheezes, rales, or rhonchi  HEART: Regular rate and rhythm; No murmurs, rubs, or gallops  ABDOMEN: Soft, non-tender, non-distended; normal bowel sounds, no organomegaly  EXTREMITIES:  2+ peripheral pulses b/l, No clubbing, cyanosis, or edema  NEUROLOGY: A&O x 3, no focal deficits  SKIN: No rashes or lesions    LABS:                        12.5   11.90 )-----------( 157      ( 09 Aug 2024 08:41 )             36.8     08-09    137  |  102  |  16  ----------------------------<  95  3.7   |  23  |  1.3    Ca    8.7      09 Aug 2024 08:41    TPro  6.2  /  Alb  4.0  /  TBili  0.5  /  DBili  x   /  AST  22  /  ALT  10  /  AlkPhos  59  08-08          Urinalysis Basic - ( 09 Aug 2024 08:41 )    Color: x / Appearance: x / SG: x / pH: x  Gluc: 95 mg/dL / Ketone: x  / Bili: x / Urobili: x   Blood: x / Protein: x / Nitrite: x   Leuk Esterase: x / RBC: x / WBC x   Sq Epi: x / Non Sq Epi: x / Bacteria: x        Culture - Urine (collected 08 Aug 2024 05:14)  Source: Clean Catch Clean Catch (Midstream)  Final Report (09 Aug 2024 23:01):    <10,000 CFU/mL Normal Urogenital Vita    Culture - Blood (collected 08 Aug 2024 05:14)  Source: .Blood Blood  Preliminary Report (09 Aug 2024 17:01):    No growth at 24 hours    Urinalysis with Rflx Culture (collected 07 Aug 2024 23:51)        RADIOLOGY & ADDITIONAL TESTS:  Results Reviewed:   Imaging Personally Reviewed:  Electrocardiogram Personally Reviewed:    COORDINATION OF CARE:  Care Discussed with Consultants/Other Providers [Y/N]:  Prior or Outpatient Records Reviewed [Y/N]:  
23yFemale  Being followed for n/v, abdominal pain  Interval history: Patient denies nausea, vomiting, hematemesis, melena, blood in stool, diarrhea, constipation, abdominal pain. patient tolerating diet, feeling much better.      PAST MEDICAL & SURGICAL HISTORY:   Eczema      No significant past surgical history                Social History: +vaping tobacco smoking. No alcohol. No illegal drug use.            MEDICATIONS  (STANDING):  pantoprazole  Injectable 40 milliGRAM(s) IV Push every 12 hours  sodium chloride 0.9%. 1000 milliLiter(s) (100 mL/Hr) IV Continuous <Continuous>    MEDICATIONS  (PRN):  acetaminophen     Tablet .. 650 milliGRAM(s) Oral every 6 hours PRN Temp greater or equal to 38C (100.4F), Mild Pain (1 - 3)  aluminum hydroxide/magnesium hydroxide/simethicone Suspension 30 milliLiter(s) Oral every 4 hours PRN Dyspepsia  morphine  - Injectable 2 milliGRAM(s) IV Push every 4 hours PRN Severe Pain (7 - 10)  morphine  - Injectable 1 milliGRAM(s) IV Push every 4 hours PRN Moderate Pain (4 - 6)  ondansetron Injectable 4 milliGRAM(s) IV Push every 4 hours PRN Nausea and/or Vomiting      Allergies:   dairy products (Unknown)  No Known Drug Allergies  Intolerances          REVIEW OF SYSTEMS:  General:  No weight loss, fevers, or chills.  Eyes:  No reported pain or visual changes  ENT:  No sore throat or runny nose.  NECK: No stiffness   CV:  No chest pain or palpitations.  Resp:  No shortness of breath, cough  GI:  No abdominal pain, nausea, vomiting, dysphagia, diarrhea or constipation. No rectal bleeding, melena, or hematemesis.  Neuro:  No tingling, numbness           VITAL SIGNS:   T(F): 98.6 (08-09-24 @ 04:04), Max: 98.9 (08-08-24 @ 13:10)  HR: 72 (08-09-24 @ 04:04) (67 - 75)  BP: 96/62 (08-09-24 @ 04:04) (93/54 - 116/68)  RR: 18 (08-09-24 @ 04:04) (18 - 18)  SpO2: 100% (08-08-24 @ 21:00) (99% - 100%)    PHYSICAL EXAM:  GENERAL: AAOx3, no acute distress.  HEAD:  Atraumatic, Normocephalic  EYES: conjunctiva and sclera clear  NECK: Supple, no JVD or thyromegaly  CHEST/LUNG: Clear to auscultation bilaterally; No wheeze, rhonchi, or rales  HEART: Regular rate and rhythm; normal S1, S2, No murmurs.  ABDOMEN: Soft, nontender, nondistended; Bowel sounds present  NEUROLOGY: No asterixis or tremor.   SKIN: Intact, no jaundice            LABS:                        12.5   11.90 )-----------( 157      ( 09 Aug 2024 08:41 )             36.8     08-09    137  |  102  |  16  ----------------------------<  95  3.7   |  23  |  1.3    Ca    8.7      09 Aug 2024 08:41    TPro  6.2  /  Alb  4.0  /  TBili  0.5  /  DBili  x   /  AST  22  /  ALT  10  /  AlkPhos  59  08-08    LIVER FUNCTIONS - ( 08 Aug 2024 22:00 )  Alb: 4.0 g/dL / Pro: 6.2 g/dL / ALK PHOS: 59 U/L / ALT: 10 U/L / AST: 22 U/L / GGT: x               IMAGING:    < from: CT Abdomen and Pelvis w/ Oral Cont and w/ IV Cont (08.08.24 @ 01:11) >    ACC: [id number] EXAM:  CT ABDOMEN AND PELVIS OC IC   ORDERED BY: ZHENG JOHNSON     PROCEDURE DATE:  08/08/2024          INTERPRETATION:  CLINICAL HISTORY: Left lower quadrant pain..    TECHNIQUE: Contiguous axial CT images were obtained of the abdomen and   pelvis following the administration of oral and intravenous contrast.   Sagittal, coronal, and MIP reformats were performed.    CONTRAST/COMPLICATIONS:  IV Contrast: Omnipaque 350   95 cc administered   5 cc discarded  Oral Contrast: Was administered.    COMPARISON: None.      FINDINGS:  LOWER CHEST: Unremarkable.    HEPATOBILIARY: Right hepatic subcentimeter hypodensity is too small to   characterize. Unremarkable CT appearance of the gallbladder.    SPLEEN: Unremarkable.    PANCREAS: Unremarkable.    ADRENAL GLANDS: Unremarkable.    KIDNEYS: Symmetric renal enhancement. No hydronephrosis. Right renal cyst.    PELVIC ORGANS: Diffuse urinary bladder wall thickening, which could be   attributed to underdistention versus urinary bladder infection/cystitis;   correlation with urinalysis recommended..    PERITONEUM/MESENTERY/BOWEL: No bowel obstruction. Normal appendix. No   free air. Trace pelvis ascites, nonspecific.    VASCULAR: Normal caliber aorta.    ABDOMINOPELVIC NODES: No enlarged abdominal or pelvic lymph nodes.    BONES/SOFT TISSUES: No acute osseous abnormality.      IMPRESSION:  Diffuse urinary bladder wall thickening, which could be attributed to   underdistention versus urinary bladder infection/cystitis; correlation   with urinalysis recommended.  Otherwise unremarkable CT abdomen and pelvis.    --- End of Report ---          ARCADIO MARADIAGA MD; Resident Radiologist  This document has been electronically signed.  JENNIFER RICKETTS MD; Attending Radiologist  This document has been electronically signed. Aug  8 2024  1:37AM    < end of copied text >            
INFECTIOUS DISEASE FOLLOW UP NOTE:    Interval History/ROS: Patient is a 23y old  Female who presents with a chief complaint of N/V (09 Aug 2024 09:38)    Overnight events: No overnight event. Feels much better. No more nausea/vomiting. Able to tolerate PO fluid.    REVIEW OF SYSTEMS:  CONSTITUTIONAL: No fever or chills  HEAD: No lesion on scalp  EYES: No visual disturbance  ENT: No sore throat  RESPIRATORY: No cough, no shortness of breath  CARDIOVASCULAR: No chest pain or palpitations  GASTROINTESTINAL: mild epigastric pain, no nausea/vomiting  GENITOURINARY: No dysuria  NEUROLOGICAL: No headache/dizziness  MUSCULOSKELETAL: No joint pain, erythema, or swelling; no back pain  SKIN: No itching, rashes  All other ROS negative except noted above      Prior hospital charts reviewed [Yes]  Primary team notes reviewed [Yes]  Other consultant notes reviewed [Yes]    Allergies:  dairy products (Unknown)  No Known Drug Allergies      ANTIMICROBIALS:       OTHER MEDS: MEDICATIONS  (STANDING):  acetaminophen     Tablet .. 650 every 6 hours PRN  aluminum hydroxide/magnesium hydroxide/simethicone Suspension 30 every 4 hours PRN  morphine  - Injectable 2 every 4 hours PRN  morphine  - Injectable 1 every 4 hours PRN  ondansetron Injectable 4 every 4 hours PRN  pantoprazole  Injectable 40 every 12 hours      Vital Signs Last 24 Hrs  T(F): 98.6 (08-09-24 @ 04:04), Max: 98.9 (08-08-24 @ 03:29)    Vital Signs Last 24 Hrs  HR: 72 (08-09-24 @ 04:04) (67 - 75)  BP: 96/62 (08-09-24 @ 04:04) (93/54 - 116/68)  RR: 18 (08-09-24 @ 04:04)  SpO2: 100% (08-08-24 @ 21:00) (99% - 100%)  Wt(kg): --    EXAM:  GENERAL: NAD, lying in bed  HEAD: No head lesions  EYES: Conjunctiva pink and cornea white  EAR, NOSE, MOUTH, THROAT: Normal external ears and nose, no discharges; moist mucous membranes  NECK: Supple, nontender to palpation; no JVD  RESPIRATORY: Clear to auscultation bilaterally  CARDIOVASCULAR: S1 S2  GASTROINTESTINAL: Soft, mild epigastric tender, nondistended; normoactive bowel sounds  GENITOURINARY: No londono catheter, no CVA tenderness  EXTREMITIES: No clubbing, cyanosis, or petal edema  NERVOUS SYSTEM: Alert and oriented to person, time, place and situation, speech clear. No focal deficits   MUSCULOSKELETAL: No joint erythema, swelling or pain  SKIN: No rashes or lesions, no superficial thrombophlebitis  PSYCH: Normal affect      Labs:                        12.5   11.90 )-----------( 157      ( 09 Aug 2024 08:41 )             36.8     08-09    137  |  102  |  16  ----------------------------<  95  3.7   |  23  |  1.3    Ca    8.7      09 Aug 2024 08:41    TPro  6.2  /  Alb  4.0  /  TBili  0.5  /  DBili  x   /  AST  22  /  ALT  10  /  AlkPhos  59  08-08      WBC Trend:  WBC Count: 11.90 (08-09-24 @ 08:41)  WBC Count: 19.21 (08-08-24 @ 22:00)  WBC Count: 16.76 (08-08-24 @ 04:30)  WBC Count: 19.11 (08-07-24 @ 22:27)      Creatine Trend:  Creatinine: 1.3 (08-09)  Creatinine: 1.5 (08-08)  Creatinine: 1.7 (08-08)  Creatinine: 1.2 (08-07)      Liver Biochemical Testing Trend:  Alanine Aminotransferase (ALT/SGPT): 10 (08-08)  Alanine Aminotransferase (ALT/SGPT): 9 (08-07)  Aspartate Aminotransferase (AST/SGOT): 22 (08-08-24 @ 22:00)  Aspartate Aminotransferase (AST/SGOT): 15 (08-07-24 @ 22:27)  Bilirubin Total: 0.5 (08-08)  Bilirubin Total: 0.6 (08-07)      Trend LDH      Urinalysis Basic - ( 09 Aug 2024 08:41 )    Color: x / Appearance: x / SG: x / pH: x  Gluc: 95 mg/dL / Ketone: x  / Bili: x / Urobili: x   Blood: x / Protein: x / Nitrite: x   Leuk Esterase: x / RBC: x / WBC x   Sq Epi: x / Non Sq Epi: x / Bacteria: x        MICROBIOLOGY:    Urinalysis with Rflx Culture (collected 07 Aug 2024 23:51)      Lactate, Blood: 1.3 (08-09 @ 08:41)  Lactate, Blood: 2.5 (08-08 @ 04:30)      RADIOLOGY:  imaging below personally reviewed    < from: CT Abdomen and Pelvis w/ Oral Cont and w/ IV Cont (08.08.24 @ 01:11) >  FINDINGS:  LOWER CHEST: Unremarkable.    HEPATOBILIARY: Right hepatic subcentimeter hypodensity is too small to   characterize. Unremarkable CT appearance of the gallbladder.    SPLEEN: Unremarkable.    PANCREAS: Unremarkable.    ADRENAL GLANDS: Unremarkable.    KIDNEYS: Symmetric renal enhancement. No hydronephrosis. Right renal cyst.    PELVIC ORGANS: Diffuse urinary bladder wall thickening, which could be   attributed to underdistention versus urinary bladder infection/cystitis;   correlation with urinalysis recommended..    PERITONEUM/MESENTERY/BOWEL: No bowel obstruction. Normal appendix. No   free air. Trace pelvis ascites, nonspecific.    VASCULAR: Normal caliber aorta.    ABDOMINOPELVIC NODES: No enlarged abdominal or pelvic lymph nodes.    BONES/SOFT TISSUES: No acute osseous abnormality.      IMPRESSION:  Diffuse urinary bladder wall thickening, which could be attributed to   underdistention versus urinary bladder infection/cystitis; correlation   with urinalysis recommended.  Otherwise unremarkable CT abdomen and pelvis.    < end of copied text >      < from: US Transvaginal (08.07.24 @ 23:24) >  IMPRESSION:  No sonographic evidence of acute pelvic pathology.    < end of copied text >

## 2024-08-10 NOTE — DISCHARGE NOTE NURSING/CASE MANAGEMENT/SOCIAL WORK - PATIENT PORTAL LINK FT
You can access the FollowMyHealth Patient Portal offered by [facility] by registering at the following website: [email or web address]. By joining [email or web address]’s FollowMyHealth portal, you will also be able to view your health information using other applications (apps) compatible with our system.

## 2024-08-10 NOTE — PROGRESS NOTE ADULT - ASSESSMENT
23yFemale pmh eczema and hypotension presents for n/v the past few days and sweating. Patient reports she had some lower abdominal pain that has now resolved. N/v resolved, no sick contacts, N-said use    #abdominal pain--->resolved  #n/v---->resolved  ddx gastritis, esophagitis, #infection, cystitis on CT  patient had normal Bm yesterday  Rec  -ppi ppx  -zofran prn  -vaping cessation advised  -advance diet as tolerated  -if vomiting persists may consider EGD  - Follow up with our [facility] located at [address] [place] [address]. Phone Number: [phone]     #Diffuse urinary bladder wall thickening, which could be attributed to   underdistention versus urinary bladder infection/cystitis; correlation   with urinalysis recommended.  Rec  - Care as per primary team         Recall GI if needed 
23-year-old female no past medical history, no past surgical history presented to the emergency department with 10/10 upper abdominal pain with associated nausea and vomiting since 7pm last night.     Abdominal pain secondary to gastritis  serum pregnancy neg;  WBC 19, afebrile; UA neg  Blood and urine culture negatove  - clear liquid diet and advance as tolerated, tolerating  - clear liquid diet  - GI following, Follow up with our [facility] located at [address] [place] [address]. Phone Number: [phone]   - ID following  - PPI and antiemetics      Dispo: DC home
23-year-old female no past medical history, no past surgical history presented to the emergency department with 10/10 upper abdominal pain with associated nausea and vomiting since 7pm last night.     # Abd pain  # N/V  - serum pregnancy neg;  WBC 19, afebrile; UA neg  PLAN  - clear liquid diet  - GI on board  - ppi ppx  - zofran prn  - clear liquid diet and advance as tolerated  - if vomiting persists may consider EGD  - Follow up with our [facility] located at [address] [place] [address]. Phone Number: [phone]   - f/u Ucx, Bcx  - will dc IV abx  - ID consult appreciated    Progress Note Handoff  Pending Consults: none  Pending Tests: CBC  Pending Results: clinical improvement   Family Discussion: patient  Disposition: Home__Xacute___/SNF______/Other_____/Unknown at this time_____  Spent over 55 min reviewing chart, speaking with patient/family and on coordinating patient care during interdisciplinary rounds 
ID is consulted for UTI  Afebrile, WBC 19.11 > 16.76 > 11.9  Satting well on RA  UA WBC 4, UCx pending, no dysuria  BCx pending    CT A/P wo contrast 8/8  Diffuse urinary bladder wall thickening, which could be attributed to   underdistention versus urinary bladder infection/cystitis; correlation   with urinalysis recommended.  Otherwise unremarkable CT abdomen and pelvis.    Patient reported eating sushi the day prior, also ate some leftover fried rice on the day of symptoms, but abdominal pain happened before eating  Overall low suspicion for UTI given no symptoms and unremarkable UA  Symptoms are possibly 2/2 food poisoning  Leukocytosis can be 2/2 stress-induced      IMPRESSION:  Infectious gastroenteritis  Food poisoning  Leukocytosis, improving  ERICH, improving    RECOMMENDATIONS:  - Monitor off abx for now  - Continue oral and IV hydration  - Will sign off. Please recall ID PRN for further questions      Itzel Christine D.O.  Attending Physician  Division of Infectious Diseases  [facility]  Please contact me via [employer] Teams

## 2024-08-10 NOTE — DISCHARGE NOTE NURSING/CASE MANAGEMENT/SOCIAL WORK - NSDCPEFALRISK_GEN_ALL_CORE
For information on Fall & Injury Prevention, visit: [email or web address] OR  [email or web address] OR  https://www.cdc.gov/steadi/patient.html

## 2024-08-13 LAB
CULTURE RESULTS: SIGNIFICANT CHANGE UP
SPECIMEN SOURCE: SIGNIFICANT CHANGE UP

## 2024-08-15 NOTE — ED PROVIDER NOTE - ATTENDING SHARED VISIT SELECTORS
8/15/2024    Chief Complaint   Patient presents with   • Video Visit   • Office Visit   • Sore Throat   • Headache       HPI:  Lori Juarez is a 62 year old year old female Who presents with sore throat and headache. Headache started a week ago. Headache is mild and radiating down neck. Sore throat stated 2 days ago. Throat is itchy. Also PND, body aches Pt exposed to child whose parent had covid. Pt denies fevers, chills, nasal congestion, cough, GI symptoms, at home covid testing, h/o covid in the past 90 days, recent use of antibiotic in the past 30 days. Using apple cider vinegar gargles with some relief.     This visit is being performed virtually via Non-integrated Video Visit. Consent to treat includes permission to submit charges to the patient's insurance. It was shared that without being seen and evaluated in person, there is a risk that the information and/or assessment may be incomplete or inaccurate. This video visit may be discontinued by patient or clinician, if it is felt that the videoconferencing connections are not adequate for her situation.   Clinical Location: [facility]  Lori's location [facility] and is physically present in   the [facility] at the time of this visit.             No current outpatient medications on file.     No current facility-administered medications for this visit.       ALLERGIES:   Allergen Reactions   • Lactase   (Food Or Med) GI UPSET       Review of Systems   Constitutional:  Positive for fatigue. Negative for chills and fever.   HENT:  Positive for postnasal drip and sore throat. Negative for congestion.    Respiratory: Negative.     Cardiovascular: Negative.    Gastrointestinal: Negative.    Musculoskeletal:  Positive for myalgias.   Skin: Negative.    Psychiatric/Behavioral: Negative.         Patient's last menstrual period was 09/03/2019 (approximate).    Past Medical History:    Diagnosis Date   • Anemia    • Carpal tunnel syndrome of left wrist 10/27/2020   • Chronic jaw pain    • H. pylori infection 10/09/2017   • Uterine cyst        Past Surgical History:   Procedure Laterality Date   • Removal of tonsils,12+ y/o         Social History     Tobacco Use   • Smoking status: Never     Passive exposure: Never   • Smokeless tobacco: Never   Substance Use Topics   • Alcohol use: No     Comment: occasional       Patient's medications, allergies, vital signs, past medical, past surgical, and social history reviewed and updated.    Examination:   /78 (BP Location: RUE - Right upper extremity, Patient Position: Sitting, Cuff Size: Regular)   Pulse 74   Temp 97.5 °F (36.4 °C) (Temporal)   Resp 18   Ht 5' (1.524 m)   Wt 75.4 kg (166 lb 1.9 oz)   LMP 09/03/2019 (Approximate)   BMI 32.44 kg/m²   BSA 1.73 m²   Sp02: 97%    Physical Exam  Constitutional:       General: She is awake. She is not in acute distress.     Appearance: Normal appearance. She is well-developed and well-groomed. She is not ill-appearing, toxic-appearing or diaphoretic.   HENT:      Head: Normocephalic and atraumatic.      Right Ear: Hearing, tympanic membrane, ear canal and external ear normal.      Left Ear: Hearing, tympanic membrane, ear canal and external ear normal.      Nose: Nose normal.      Mouth/Throat:      Lips: Pink.      Mouth: Mucous membranes are moist.      Pharynx: Oropharynx is clear. Uvula midline. Posterior oropharyngeal erythema present. No pharyngeal swelling, oropharyngeal exudate or uvula swelling.      Tonsils: No tonsillar exudate or tonsillar abscesses. 0 on the right. 0 on the left.   Cardiovascular:      Rate and Rhythm: Normal rate and regular rhythm.      Heart sounds: Normal heart sounds, S1 normal and S2 normal.   Pulmonary:      Effort: Pulmonary effort is normal.      Breath sounds: Normal breath sounds and air entry.   Neurological:      Mental Status: She is alert and oriented  to person, place, and time.   Psychiatric:         Attention and Perception: Attention and perception normal.         Mood and Affect: Mood and affect normal.         Speech: Speech normal.         Behavior: Behavior normal. Behavior is cooperative.         Thought Content: Thought content normal.         Cognition and Memory: Cognition and memory normal.         Judgment: Judgment normal.           Assessment/Plan:  Diagnoses and all orders for this visit:  Acute URI  -     POCT Rapid strep A  -     POCT SARS-COV-2 Antigen/Flu Antigen Panel via [employer]  -     2019 Novel Coronavirus (SARS-CoV-2)  -     STREPTOCOCCUS GROUP A (STREPTOCOCCUS PYOGENES), BACTERIAL CULTURE      Results for orders placed or performed in visit on 08/15/24   POCT Rapid strep A   Result Value    GRP A STREP Negative    Internal Procedural Controls Acceptable Yes    TEST LOT NUMBER [id number]    TEST LOT EXPIRATION DATE 10/30/2026   POCT SARS-COV-2 Antigen/Flu Antigen Panel via [employer]   Result Value    POCT SARS-COV-2 ANTIGEN Not Detected    Rapid Influenza A Ag Negative    Rapid Influenza B Ag Negative    TEST LOT NUMBER [id number]    TEST LOT EXPIRATION DATE 01/17/2025     Rapid strep, covid/flu A&B tests negative in clinic. Pt informed tests were negative, results printed with AVS and available on patient portal account.   Pt leaving for [place] in a couple of weeks and would like to have covid PCR and throat culture sent.   Throat culture and covid PCR sent, pt consents to medical information being sent via pt portal.   Wash hands frequently to prevent spread of germs and cough into sleeve/bend of elbow.  Stay hydrated and push fluids, you should drink a minimum of 6-8 8 oz. cups of fluid per day.  Humidity helps with symptoms, use humidifier in room or take a hot shower once to twice a day.   To help with throat pain, drink warm fluids such as hot tea or hot soup.  Add honey to hot tea to help with throat pain.   May mix ½ strength hydrogen  peroxide with salt water to gargle to ease sore throat.  May use netti-pot or saline nasal rinse 2 times per day.  May use nasal saline spray 4-5 times per day.  May use Mucinex  over the counter as needed.  May use a decongestant to help with nasal congestion. DO NOT USE if you have a history of high blood pressure.   May use ibuprofen and or acetaminophen as needed for pain or fever.  May use otc dextromethorphan to help with cough.   May use either Nyquil or Benadryl at night to dry up mucus production and help sleep.  In future, with first sign of cold symptoms, start zinc lozenges or echinacea tea with honey to try and reduce cold symptoms and duration.    Follow-up with primary care provider in one week if no improvement or sooner if worsening symptoms such as fevers, facial pain, tooth pain, or worsening congestion despite treatment.    Go directly to ER for moderate to severe shortness of breath, call 911 if severe or continuous chest pains or pressure, or difficulty speaking.    Pt verbalizes understanding of plan. All questions answered. AVS printed and/or available on patient portal account.     Physical exam performed remotely by provider. The MOA is on site with patient using a Mitro device to assist with physical assessment portion of visit.     Lindsey Turcios MSN, APRN-FPA, FNP-C        08/15/24    10:31 AM               History/Exam/Medical Decision Making

## 2024-08-19 DIAGNOSIS — I95.9 HYPOTENSION, UNSPECIFIED: ICD-10-CM

## 2024-08-19 DIAGNOSIS — A08.4 VIRAL INTESTINAL INFECTION, UNSPECIFIED: ICD-10-CM

## 2024-08-19 DIAGNOSIS — R11.2 NAUSEA WITH VOMITING, UNSPECIFIED: ICD-10-CM

## 2024-08-19 DIAGNOSIS — N17.9 ACUTE KIDNEY FAILURE, UNSPECIFIED: ICD-10-CM

## 2024-08-19 DIAGNOSIS — L30.9 DERMATITIS, UNSPECIFIED: ICD-10-CM

## 2024-08-19 DIAGNOSIS — K20.90 ESOPHAGITIS, UNSPECIFIED WITHOUT BLEEDING: ICD-10-CM

## 2025-03-01 ENCOUNTER — EMERGENCY (EMERGENCY)
Facility: HOSPITAL | Age: 25
LOS: 0 days | Discharge: HOME | End: 2025-03-01
Attending: STUDENT IN AN ORGANIZED HEALTH CARE EDUCATION/TRAINING PROGRAM
Payer: COMMERCIAL

## 2025-03-01 VITALS
TEMPERATURE: 98 F | SYSTOLIC BLOOD PRESSURE: 104 MMHG | RESPIRATION RATE: 20 BRPM | OXYGEN SATURATION: 99 % | WEIGHT: 115.08 LBS | HEART RATE: 109 BPM | DIASTOLIC BLOOD PRESSURE: 60 MMHG

## 2025-03-01 DIAGNOSIS — Z91.011 ALLERGY TO MILK PRODUCTS: ICD-10-CM

## 2025-03-01 DIAGNOSIS — T78.1XXA OTHER ADVERSE FOOD REACTIONS, NOT ELSEWHERE CLASSIFIED, INITIAL ENCOUNTER: ICD-10-CM

## 2025-03-01 DIAGNOSIS — R21 RASH AND OTHER NONSPECIFIC SKIN ERUPTION: ICD-10-CM

## 2025-03-01 DIAGNOSIS — L29.9 PRURITUS, UNSPECIFIED: ICD-10-CM

## 2025-03-01 PROCEDURE — 99284 EMERGENCY DEPT VISIT MOD MDM: CPT

## 2025-03-01 PROCEDURE — 96375 TX/PRO/DX INJ NEW DRUG ADDON: CPT

## 2025-03-01 PROCEDURE — 99284 EMERGENCY DEPT VISIT MOD MDM: CPT | Mod: 25

## 2025-03-01 PROCEDURE — 96374 THER/PROPH/DIAG INJ IV PUSH: CPT

## 2025-03-01 RX ORDER — METHYLPREDNISOLONE ACETATE 80 MG/ML
125 INJECTION, SUSPENSION INTRA-ARTICULAR; INTRALESIONAL; INTRAMUSCULAR; SOFT TISSUE ONCE
Refills: 0 | Status: COMPLETED | OUTPATIENT
Start: 2025-03-01 | End: 2025-03-01

## 2025-03-01 RX ORDER — DIPHENHYDRAMINE HCL 12.5MG/5ML
1 ELIXIR ORAL
Qty: 15 | Refills: 0
Start: 2025-03-01 | End: 2025-03-05

## 2025-03-01 RX ORDER — DIPHENHYDRAMINE HCL 12.5MG/5ML
50 ELIXIR ORAL ONCE
Refills: 0 | Status: COMPLETED | OUTPATIENT
Start: 2025-03-01 | End: 2025-03-01

## 2025-03-01 RX ORDER — PREDNISONE 20 MG/1
1 TABLET ORAL
Qty: 5 | Refills: 0
Start: 2025-03-01 | End: 2025-03-05

## 2025-03-01 RX ADMIN — METHYLPREDNISOLONE ACETATE 125 MILLIGRAM(S): 80 INJECTION, SUSPENSION INTRA-ARTICULAR; INTRALESIONAL; INTRAMUSCULAR; SOFT TISSUE at 13:42

## 2025-03-01 RX ADMIN — Medication 50 MILLIGRAM(S): at 13:42

## 2025-03-01 RX ADMIN — Medication 1000 MILLILITER(S): at 13:43

## 2025-03-01 RX ADMIN — Medication 100 MILLIGRAM(S): at 13:43

## 2025-04-21 ENCOUNTER — EMERGENCY (EMERGENCY)
Facility: HOSPITAL | Age: 25
LOS: 0 days | Discharge: HOME | End: 2025-04-21
Attending: EMERGENCY MEDICINE
Payer: COMMERCIAL

## 2025-04-21 VITALS
HEIGHT: 64 IN | DIASTOLIC BLOOD PRESSURE: 55 MMHG | RESPIRATION RATE: 20 BRPM | HEART RATE: 105 BPM | OXYGEN SATURATION: 100 % | SYSTOLIC BLOOD PRESSURE: 131 MMHG | TEMPERATURE: 98 F | WEIGHT: 115.08 LBS

## 2025-04-21 DIAGNOSIS — Z91.011 ALLERGY TO MILK PRODUCTS: ICD-10-CM
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
## 2025-04-21 DIAGNOSIS — L50.9 URTICARIA, UNSPECIFIED: ICD-10-CM
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
## 2025-04-21 DIAGNOSIS — T78.40XA ALLERGY, UNSPECIFIED, INITIAL ENCOUNTER: ICD-10-CM

## 2025-04-21 DIAGNOSIS — L30.9 DERMATITIS, UNSPECIFIED: ICD-10-CM

## 2025-04-21 PROCEDURE — 99284 EMERGENCY DEPT VISIT MOD MDM: CPT | Mod: 25

## 2025-04-21 PROCEDURE — 99284 EMERGENCY DEPT VISIT MOD MDM: CPT

## 2025-04-21 PROCEDURE — 96374 THER/PROPH/DIAG INJ IV PUSH: CPT

## 2025-04-21 PROCEDURE — 96375 TX/PRO/DX INJ NEW DRUG ADDON: CPT

## 2025-04-21 RX ORDER — PREDNISONE 20 MG/1
3 TABLET ORAL
Qty: 9 | Refills: 0
Start: 2025-04-21 | End: 2025-04-23

## 2025-04-21 RX ORDER — DIPHENHYDRAMINE HCL 12.5MG/5ML
50 ELIXIR ORAL ONCE
Refills: 0 | Status: COMPLETED | OUTPATIENT
Start: 2025-04-21 | End: 2025-04-21

## 2025-04-21 RX ORDER — SODIUM CHLORIDE 9 G/1000ML
1000 INJECTION, SOLUTION INTRAVENOUS ONCE
Refills: 0 | Status: COMPLETED | OUTPATIENT
Start: 2025-04-21 | End: 2025-04-21

## 2025-04-21 RX ORDER — DIPHENHYDRAMINE HCL 12.5MG/5ML
1 ELIXIR ORAL
Qty: 15 | Refills: 0
Start: 2025-04-21 | End: 2025-04-25

## 2025-04-21 RX ORDER — METHYLPREDNISOLONE ACETATE 80 MG/ML
125 INJECTION, SUSPENSION INTRA-ARTICULAR; INTRALESIONAL; INTRAMUSCULAR; SOFT TISSUE ONCE
Refills: 0 | Status: COMPLETED | OUTPATIENT
Start: 2025-04-21 | End: 2025-04-21

## 2025-04-21 RX ADMIN — SODIUM CHLORIDE 1000 MILLILITER(S): 9 INJECTION, SOLUTION INTRAVENOUS at 18:20

## 2025-04-21 RX ADMIN — Medication 20 MILLIGRAM(S): at 18:20

## 2025-04-21 RX ADMIN — Medication 50 MILLIGRAM(S): at 18:20

## 2025-04-21 RX ADMIN — METHYLPREDNISOLONE ACETATE 125 MILLIGRAM(S): 80 INJECTION, SUSPENSION INTRA-ARTICULAR; INTRALESIONAL; INTRAMUSCULAR; SOFT TISSUE at 18:20

## 2025-04-21 NOTE — ED PROVIDER NOTE - PATIENT PORTAL LINK FT
You can access the FollowMyHealth Patient Portal offered by [facility] by registering at the following website: [email or web address]. By joining [facility]’s FollowMyHealth portal, you will also be able to view your health information using other applications (apps) compatible with our system.

## 2025-04-21 NOTE — ED PROVIDER NOTE - CLINICAL SUMMARY MEDICAL DECISION MAKING FREE TEXT BOX
24-year-old female past medical history of eczema, allergic reactions to dairy presents to the emergency department for hives and itching from an allergic reaction.  Patient was at the dentist an hour prior to symptoms starting.  She had a cleaning and did not receive any medications or injections.  Patient started breaking out with hives and being itchy all over the place and felt scratchiness to her throat.    Patient brought into the emergency department immediately placed on monitor.  Patient has diffuse urticaria.  Patient is tolerating her secretions and has clear lungs and normal voice.  IV placed and appropriate medications given to include steroids and Benadryl.  Patient was observed in the ED and made significant improvement.  Will discharge with appropriate home care and follow-up and patient already has an EpiPen and had allergy testing.  All questions and concerns addressed.    Full DC instructions discussed and patient knows when to seek immediate medical attention.  Patient has proper follow up.  All results discussed and patient aware they may require further work up.  Proper follow up ensured. Limitations of ED work up discussed.  Medications administered and prescribed/OTC home meds discussed.  All questions and concerns from patient or family addressed. Understanding of instructions verbalized.

## 2025-04-21 NOTE — ED PROVIDER NOTE - DIFFERENTIAL DIAGNOSIS
Differential Diagnosis The differential diagnosis for patients clinical presentation includes but is not limited to:  allergic reaction, anaphylaxis

## 2025-04-21 NOTE — ED ADULT NURSE NOTE - NS ED NURSE RECORD ANOTHER HT AND WT
Yes
Show Aperture Variable?: Yes
Consent: The patient's consent was obtained.  Risks were discussed, including but not limited to crusting, blistering, scarring, darker or lighter pigmentary change, recurrence, incomplete removal and infection.
Post-Care Instructions: I reviewed with the patient in detail post-care instructions and provided them in written format.  Advised daily-bid gentle cleansing with soap and water or hydrogen peroxide followed by application of a thin layer of Vaseline until fully healed.
Duration Of Freeze Thaw-Cycle (Seconds): 0
Render Note In Bullet Format When Appropriate: No
Detail Level: Simple

## 2025-04-21 NOTE — ED PROVIDER NOTE - PHYSICAL EXAMINATION
--EXAM--  VITAL SIGNS: I have reviewed vs documented at present.  CONSTITUTIONAL: Well-developed; well-nourished; in no acute distress.   SKIN: hives to torso and back   HEAD: Normocephalic; atraumatic.  EYES: PERRL, EOM intact; conjunctiva and sclera clear. No nystagmus.  ENT: No nasal discharge; airway clear.  NECK: Supple; non tender.  CARD: S1, S2, Regular rate and rhythm.   RESP: No wheezes, rales or rhonchi.

## 2025-04-21 NOTE — ED PROVIDER NOTE - OBJECTIVE STATEMENT
24-year-old female presents to the ED for evaluation after possible allergic reaction.  Patient states she was at the dentist today and just had a cleaning.  Patient states an hour after the dentist she noticed that she broke out in hives.  Patient does not report anything new.  Patient states she is not sure what caused the reaction.

## 2025-04-21 NOTE — ED PROVIDER NOTE - ATTENDING APP SHARED VISIT CONTRIBUTION OF CARE
I personally evaluated patient. I agree with the findings and plan with all documentation on chart except as documented  in my note.    24-year-old female past medical history of eczema, allergic reactions to dairy presents to the emergency department for hives and itching from an allergic reaction.  Patient was at the dentist an hour prior to symptoms starting.  She had a cleaning and did not receive any medications or injections.  Patient started breaking out with hives and being itchy all over the place and felt scratchiness to her throat.    Patient brought into the emergency department immediately placed on monitor.  Patient has diffuse urticaria.  Patient is tolerating her secretions and has clear lungs and normal voice.  IV placed and appropriate medications given to include steroids and Benadryl.  Patient was observed in the ED and made significant improvement.  Will discharge with appropriate home care and follow-up and patient already has an EpiPen and had allergy testing.  All questions and concerns addressed.    Full DC instructions discussed and patient knows when to seek immediate medical attention.  Patient has proper follow up.  All results discussed and patient aware they may require further work up.  Proper follow up ensured. Limitations of ED work up discussed.  Medications administered and prescribed/OTC home meds discussed.  All questions and concerns from patient or family addressed. Understanding of instructions verbalized.